# Patient Record
Sex: FEMALE | Race: BLACK OR AFRICAN AMERICAN
[De-identification: names, ages, dates, MRNs, and addresses within clinical notes are randomized per-mention and may not be internally consistent; named-entity substitution may affect disease eponyms.]

---

## 2017-06-24 ENCOUNTER — HOSPITAL ENCOUNTER (EMERGENCY)
Dept: HOSPITAL 61 - ER | Age: 27
Discharge: HOME | End: 2017-06-24
Payer: SELF-PAY

## 2017-06-24 VITALS — WEIGHT: 154 LBS | HEIGHT: 64 IN | BODY MASS INDEX: 26.29 KG/M2

## 2017-06-24 VITALS — SYSTOLIC BLOOD PRESSURE: 112 MMHG | DIASTOLIC BLOOD PRESSURE: 64 MMHG

## 2017-06-24 DIAGNOSIS — R07.89: ICD-10-CM

## 2017-06-24 DIAGNOSIS — J02.0: Primary | ICD-10-CM

## 2017-06-24 LAB
NEGATIVE OBC STREP: (no result)
POSITIVE OBC STREP: (no result)

## 2017-06-24 PROCEDURE — 81025 URINE PREGNANCY TEST: CPT

## 2017-06-24 PROCEDURE — 87880 STREP A ASSAY W/OPTIC: CPT

## 2017-06-24 PROCEDURE — 71020: CPT

## 2017-06-24 PROCEDURE — 93005 ELECTROCARDIOGRAM TRACING: CPT

## 2017-06-24 NOTE — EKG
[facility]

              [address] [place] [address]

Test Date:    2017               Test Time:    06:21:58

Pat Name:     JEREMIAS HILL           Department:   

Patient ID:   [id number]           Room:          

Gender:       F                        Technician:   

:          [id number]               Requested By: MADALYN SERNA

Order Number: [id number]            Reading MD:     

                                 Measurements

Intervals                              Axis          

Rate:         71                       P:            39

WV:           166                      QRS:          6

QRSD:         72                       T:            1

QT:           370                                    

QTc:          407                                    

                           Interpretive Statements

SINUS RHYTHM

QRS(T) CONTOUR ABNORMALITY

CANNOT RULE OUT ANTEROSEPTAL MYOCARDIAL DAMAGE

RI6.01          Unconfirmed report

No previous ECG available for comparison

## 2017-06-24 NOTE — RAD
PA AND LATERAL CHEST RADIOGRAPH



Clinical Indication: chest wall pain.    



Comparison: Two-view chest 6/1/2011.



Findings: 

The cardiomediastinal silhouette is normal. Pulmonary vasculature is normal.

The lungs are clear.  No pleural effusion or pneumothorax is seen. There is no

acute bone abnormality.  



IMPRESSION: 

No acute cardiopulmonary process.

## 2017-06-24 NOTE — PHYS DOC
Past Medical History


Past Medical History:  Other


Additional Past Medical Histor:  post part depression


Past Surgical History:  , Other


Additional Past Surgical Histo:  multiple obgyn sug with bilat fallopian tube 

removal. c sect x 4


Alcohol Use:  None


Drug Use:  None





Adult General


Chief Complaint


Chief Complaint:  MULTIPLE COMPLAINTS





HPI


HPI





27-year-old female presenting in the emergency department with a sore throat 

for 3 days. She has sharp nonradiating pleuritic chest pain as well. The pain 

is intermittent without alleviating or exacerbating factors. She reports a 

contacts. She denies having a cough. She denies unilateral leg swelling 

hemoptysis personal or family history of blood clotting disorders or recent 

surgery. She denies ever smoking and denies diabetes hypertension 

hyperlipidemia.





Review of systems is negative for nausea vomiting diaphoresis fevers or chills. 

All other review of systems is negative unless otherwise noted in history of 

present illness.





ED course: 27-year-old female presenting with sore throat and pleuritic chest 

pain. Afebrile with a normal heart rate. Saturating on a percent on room 

air.Perc neg. EKG obtained which shows sinus rhythm with a regular rate. ST 

segments congruent. Not suggestive of ACS. Nonspecific T-wave inversions 

present. Chest x-ray obtained and strep test sent. Chest x-ray reviewed by 

myself shows no obvious infiltrate or pneumothorax present. No  obvious acute 

cardiopulmonary process present. Strep test positive. The patient was given 

antibiotics upon discharge. The patient was then discharged home in stable 

condition to follow up with their primary care physician over the next 2-3 

days. They were to return if their symptoms worsened or if they were concerned 

for any reason. Face-to-face discharge instructions and return precautions were 

given. Patient's questions were answered to their satisfaction. Patient is 

comfortable plan.





Review of Systems


Review of Systems


SEE ABOVE.





Allergies


Allergies





Allergies








Coded Allergies Type Severity Reaction Last Updated Verified


 


  No Known Allergies Allergy Unknown  16 Yes











Physical Exam


Physical Exam





Constitutional: Well developed, well nourished, no acute distress, non-toxic 

appearance. []


HENT: Normocephalic, atraumatic, bilateral external ears normal, oropharynx 

moist, no oral exudates, nose normal. []


Eyes: PERRLA, EOMI, conjunctiva normal, no discharge. [] 


Neck: Normal range of motion, no tenderness, supple, no stridor. [] 


Cardiovascular:Heart rate regular rhythm, no murmur []


Lungs & Thorax:  Bilateral breath sounds clear to auscultation []


Abdomen: Bowel sounds normal, soft, no tenderness, no masses, no pulsatile 

masses. [] 


Skin: Warm, dry, no erythema, no rash. [] 


Back: No tenderness, no CVA tenderness. [] 


Extremities: No tenderness, no cyanosis, no clubbing, ROM intact, no edema. [] 


Neurologic: Alert and oriented X 3, normal motor function, normal sensory 

function, no focal deficits noted. []


Psychologic: Affect normal, judgement normal, mood normal. []





Current Patient Data


Vital Signs





 Vital Signs








  Date Time  Temp Pulse Resp B/P (MAP) Pulse Ox O2 Delivery O2 Flow Rate FiO2


 


17 06:21 98.9 77 16 112/64 (80) 99 Room Air  





 98.9       








Lab Values





 Laboratory Tests








Test


  17


05:41


 


POC Urine HCG, Qualitative


  Hcg negative


(Negative)











EKG


EKG


[]





Radiology/Procedures


Radiology/Procedures


[]





Course & Med Decision Making


Course & Med Decision Making


Pertinent Labs and Imaging studies reviewed. (See chart for details)





[]





Dragon Disclaimer


Dragon Disclaimer


This electronic medical record was generated, in whole or in part, using a 

voice recognition dictation system.





Departure


Departure


Impression:  


 Primary Impression:  


 Strep throat


 Additional Impressions:  


 Sore throat


 Chest pain


Disposition:   HOME, SELF-CARE


Condition:  STABLE


Referrals:  


NO PCP (PCP)








BRYNN CHAN MD


Patient Instructions:  Chest Pain (Nonspecific), Sore Throat





Additional Instructions:  


Thank you for allowing us to participate in your care today.





Followup with your primary care physician in 3 days if your symptoms do not 

improve.  Call your Primary Doctor tomorrow and inform them of your visit 

today.  If you do not have a primary care provider you can ask for a list of 

our primary care providers. Return to the emergency department you have any new 

or concerning findings.





This should be evaluated by the primary care physician and any necessary 

consulting services for continued management within a few days after discharge. 

Return to emergency room if you have any  new or concerning symptoms including 

but not limited to fever, chills, nausea, vomiting, intractable pain, any new 

rashes, chest pain, shortness of air, uncontrolled bleeding, difficulty 

breathing, and/or vision loss.


Scripts


Amoxicillin (AMOXICILLIN) 500 Mg Capsule


1 CAP PO BID, #20 CAP


   Prov: MADALYN SERNA MD         17





Problem Qualifiers











MADALYN SERNA MD 2017 06:37

## 2019-07-15 ENCOUNTER — HOSPITAL ENCOUNTER (EMERGENCY)
Dept: HOSPITAL 61 - ER | Age: 29
Discharge: HOME | End: 2019-07-15
Payer: COMMERCIAL

## 2019-07-15 VITALS — SYSTOLIC BLOOD PRESSURE: 106 MMHG | DIASTOLIC BLOOD PRESSURE: 55 MMHG

## 2019-07-15 VITALS — HEIGHT: 64 IN | WEIGHT: 169 LBS | BODY MASS INDEX: 28.85 KG/M2

## 2019-07-15 DIAGNOSIS — Z88.2: ICD-10-CM

## 2019-07-15 DIAGNOSIS — G89.29: ICD-10-CM

## 2019-07-15 DIAGNOSIS — N39.0: Primary | ICD-10-CM

## 2019-07-15 DIAGNOSIS — Z90.711: ICD-10-CM

## 2019-07-15 LAB
ALBUMIN SERPL-MCNC: 4 G/DL (ref 3.4–5)
ALBUMIN/GLOB SERPL: 1 {RATIO} (ref 1–1.7)
ALP SERPL-CCNC: 48 U/L (ref 46–116)
ALT SERPL-CCNC: 16 U/L (ref 14–59)
ANION GAP SERPL CALC-SCNC: 14 MMOL/L (ref 6–14)
APTT PPP: YELLOW S
AST SERPL-CCNC: 21 U/L (ref 15–37)
BACTERIA #/AREA URNS HPF: (no result) /HPF
BASOPHILS # BLD AUTO: 0 X10^3/UL (ref 0–0.2)
BASOPHILS NFR BLD: 1 % (ref 0–3)
BILIRUB SERPL-MCNC: 0.3 MG/DL (ref 0.2–1)
BILIRUB UR QL STRIP: NEGATIVE
BUN SERPL-MCNC: 17 MG/DL (ref 7–20)
BUN/CREAT SERPL: 19 (ref 6–20)
CALCIUM SERPL-MCNC: 9 MG/DL (ref 8.5–10.1)
CHLORIDE SERPL-SCNC: 102 MMOL/L (ref 98–107)
CO2 SERPL-SCNC: 25 MMOL/L (ref 21–32)
CREAT SERPL-MCNC: 0.9 MG/DL (ref 0.6–1)
EOSINOPHIL NFR BLD: 0.1 X10^3/UL (ref 0–0.7)
EOSINOPHIL NFR BLD: 1 % (ref 0–3)
ERYTHROCYTE [DISTWIDTH] IN BLOOD BY AUTOMATED COUNT: 19 % (ref 11.5–14.5)
FIBRINOGEN PPP-MCNC: (no result) MG/DL
GFR SERPLBLD BASED ON 1.73 SQ M-ARVRAT: 88.4 ML/MIN
GLOBULIN SER-MCNC: 3.9 G/DL (ref 2.2–3.8)
GLUCOSE SERPL-MCNC: 89 MG/DL (ref 70–99)
HCT VFR BLD CALC: 26.2 % (ref 36–47)
HGB BLD-MCNC: 8.1 G/DL (ref 12–15.5)
HYPOCHROMIA BLD QL SMEAR: SLIGHT
LYMPHOCYTES # BLD: 1 X10^3/UL (ref 1–4.8)
LYMPHOCYTES NFR BLD AUTO: 13 % (ref 24–48)
MACROCYTES BLD QL SMEAR: SLIGHT
MCH RBC QN AUTO: 21 PG (ref 25–35)
MCHC RBC AUTO-ENTMCNC: 31 G/DL (ref 31–37)
MCV RBC AUTO: 69 FL (ref 79–100)
MICROCYTES BLD QL SMEAR: (no result)
MONO #: 0.6 X10^3/UL (ref 0–1.1)
MONOCYTES NFR BLD: 8 % (ref 0–9)
NEUT #: 6 X10^3/UL (ref 1.8–7.7)
NEUTROPHILS NFR BLD AUTO: 77 % (ref 31–73)
NITRITE UR QL STRIP: POSITIVE
PH UR STRIP: 5.5 [PH]
PLATELET # BLD AUTO: 248 X10^3/UL (ref 140–400)
PLATELET # BLD EST: ADEQUATE 10*3/UL
POTASSIUM SERPL-SCNC: 3.6 MMOL/L (ref 3.5–5.1)
PROT SERPL-MCNC: 7.9 G/DL (ref 6.4–8.2)
PROT UR STRIP-MCNC: 100 MG/DL
RBC # BLD AUTO: 3.81 X10^6/UL (ref 3.5–5.4)
RBC #/AREA URNS HPF: (no result) /HPF (ref 0–2)
SODIUM SERPL-SCNC: 141 MMOL/L (ref 136–145)
SQUAMOUS #/AREA URNS LPF: (no result) /LPF
UROBILINOGEN UR-MCNC: 1 MG/DL
WBC # BLD AUTO: 7.8 X10^3/UL (ref 4–11)
WBC #/AREA URNS HPF: (no result) /HPF (ref 0–4)

## 2019-07-15 PROCEDURE — 81001 URINALYSIS AUTO W/SCOPE: CPT

## 2019-07-15 PROCEDURE — 96374 THER/PROPH/DIAG INJ IV PUSH: CPT

## 2019-07-15 PROCEDURE — 99285 EMERGENCY DEPT VISIT HI MDM: CPT

## 2019-07-15 PROCEDURE — 85025 COMPLETE CBC W/AUTO DIFF WBC: CPT

## 2019-07-15 PROCEDURE — 87086 URINE CULTURE/COLONY COUNT: CPT

## 2019-07-15 PROCEDURE — 96375 TX/PRO/DX INJ NEW DRUG ADDON: CPT

## 2019-07-15 PROCEDURE — 87186 SC STD MICRODIL/AGAR DIL: CPT

## 2019-07-15 PROCEDURE — 80053 COMPREHEN METABOLIC PANEL: CPT

## 2019-07-15 PROCEDURE — 74176 CT ABD & PELVIS W/O CONTRAST: CPT

## 2019-07-15 PROCEDURE — 81025 URINE PREGNANCY TEST: CPT

## 2019-07-15 PROCEDURE — 36415 COLL VENOUS BLD VENIPUNCTURE: CPT

## 2019-07-15 NOTE — PHYS DOC
Past Medical History


Past Medical History:  Arthritis, Other


Additional Past Medical Histor:  post partum depression,CHRONIC BACK 

PAIN,BULGING DISC


 (KIYA DOLL)


Past Surgical History:  , Other


Additional Past Surgical Histo:  mult obgyn sug w/bilat fallopian tube removal,c

sect x 4,BLOOD CLOTS/STOMAC


 (KIYA DOLL)


Alcohol Use:  None


Drug Use:  None


 (KIYA DOLL)





Adult General


Chief Complaint


Chief Complaint:  FLANK PAIN





HPI


HPI





29-year-old female presents to ER via POV for complaints of left flank pain 

which radiates into left lower abdomen. Patient states yesterday she developed 

fever and symptoms have worsened. Patient states she took ibuprofen 800 mg at 4 

PM today with minimal relief in symptoms current temperature 100.5. Patient 

reports she has reoccurring UTIs and typically has 3 per month. She called a 

urologist today for an appointment but they wanted her to have labs prior to any

appointment. Patient reports she has had a partial hysterectomy. Patient denies 

nausea or vomiting.


 (KIYA DOLL)





Review of Systems


Review of Systems





Constitutional: Reports fever and fatigue


Eyes: Denies change in visual acuity, redness, or eye pain []


HENT: Denies nasal congestion or sore throat []


Respiratory: Denies cough or shortness of breath []


Cardiovascular: No additional information not addressed in HPI []


GI: Denies nausea, vomiting, bloody stools or diarrhea. Reports lt flank pain 

radiating to lt side lower abd


: Denies hematuria. Reports dysuria


Musculoskeletal: Denies joint pain. Reports lt flank pain


Integument: Denies rash or skin lesions []


Neurologic: Denies headache, focal weakness or sensory changes []





All other systems were reviewed and found to be within normal limits, except as 

documented in this note.


 (KIYA DOLL)





Current Medications


Current Medications





Current Medications








 Medications


  (Trade)  Dose


 Ordered  Sig/Annie  Start Time


 Stop Time Status Last Admin


Dose Admin


 


 Acetaminophen


  (Tylenol)  1,000 mg  1X  ONCE  7/15/19 19:45


 7/15/19 19:51 DC 7/15/19 19:49


1,000 MG


 


 Ceftriaxone Sodium


  (Rocephin)  1 gm  1X  ONCE  7/15/19 20:45


 7/15/19 20:46 DC 7/15/19 20:47


1 GM


 


 Fentanyl Citrate


  (Fentanyl 2ml


 Vial)  50 mcg  1X  ONCE  7/15/19 21:00


 7/15/19 21:01 DC 7/15/19 21:10


50 MCG


 


 Sodium Chloride  1,000 ml @ 


 1,000 mls/hr  1X  ONCE  7/15/19 21:30


 7/15/19 22:29 DC 7/15/19 21:10


1,000 MLS/HR





 (GENET REGALADO MD)





Allergies


Allergies





Allergies








Coded Allergies Type Severity Reaction Last Updated Verified


 


  Sulfa (Sulfonamide Antibiotics) Allergy Severe SWELLING, RASH 7/15/19 Yes





 (GENET REGALADO MD)





Physical Exam


Physical Exam





Constitutional: Well developed, well nourished, no acute distress, non-toxic 

appearance. []


HENT: Normocephalic, atraumatic, oropharynx moist, nose normal. []


Eyes: Pupils equal, conjunctiva normal, no discharge. [] 


Neck: Normal range of motion, supple, no stridor. [] 


Cardiovascular: Heart rate regular rhythm 90s during exam, no murmur []


Lungs & Thorax:  Bilateral breath sounds clear to auscultation- resp. equal/no

nlabored


Abdomen: Bowel sounds normal, soft- no distention/rigidity, no tenderness on 

palp., no masses, no pulsatile masses. [] 


Skin: Warm, dry, no erythema, no rash. [] 


Back: No tenderness, lt CVA tenderness. No rt sided CVA tenderness


Extremities: No tenderness, no cyanosis, no clubbing, ROM intact, no edema. [] 


Neurologic: Alert and oriented X 3, normal motor function, normal sensory fu

nction, no focal deficits noted. []


Psychologic: Affect normal, judgement normal, mood normal. []


 (REFFITT,KIYA WOODSON)





Current Patient Data


Vital Signs





                                   Vital Signs








  Date Time  Temp Pulse Resp B/P (MAP) Pulse Ox O2 Delivery O2 Flow Rate FiO2


 


7/15/19 22:00  88 16 106/55 (72) 96 Room Air  


 


7/15/19 18:40 100.5       





 100.5       





 (GENET REGALADO MD)


Lab Values





                                Laboratory Tests








Test


 7/15/19


18:42 7/15/19


18:46 7/15/19


19:45


 


Urine Collection Type Void    


 


Urine Color Yellow    


 


Urine Clarity Cloudy    


 


Urine pH 5.5    


 


Urine Specific Gravity 1.015    


 


Urine Protein


 100 mg/dL


(NEG-TRACE) 


 





 


Urine Glucose (UA)


 Negative mg/dL


(NEG) 


 





 


Urine Ketones (Stick)


 Negative mg/dL


(NEG) 


 





 


Urine Blood


 Moderate (NEG)


 


 





 


Urine Nitrite


 Positive (NEG)


 


 





 


Urine Bilirubin


 Negative (NEG)


 


 





 


Urine Urobilinogen Dipstick


 1.0 mg/dL (0.2


mg/dL) 


 





 


Urine Leukocyte Esterase Large (NEG)    


 


Urine RBC


 1-2 /HPF (0-2)


 


 





 


Urine WBC


 Tntc /HPF


(0-4) 


 





 


Urine Squamous Epithelial


Cells Few /LPF  


 


 





 


Urine Bacteria


 Many /HPF


(0-FEW) 


 





 


Urine Mucus Slight /LPF    


 


POC Urine HCG, Qualitative


 


 Hcg negative


(Negative) 





 


White Blood Count


 


 


 7.8 x10^3/uL


(4.0-11.0)


 


Red Blood Count


 


 


 3.81 x10^6/uL


(3.50-5.40)


 


Hemoglobin


 


 


 8.1 g/dL


(12.0-15.5)  L


 


Hematocrit


 


 


 26.2 %


(36.0-47.0)  L


 


Mean Corpuscular Volume


 


 


 69 fL ()


L


 


Mean Corpuscular Hemoglobin


 


 


 21 pg (25-35)


L


 


Mean Corpuscular Hemoglobin


Concent 


 


 31 g/dL


(31-37)


 


Red Cell Distribution Width


 


 


 19.0 %


(11.5-14.5)  H


 


Platelet Count


 


 


 248 x10^3/uL


(140-400)


 


Neutrophils (%) (Auto)   77 % (31-73)  H


 


Lymphocytes (%) (Auto)   13 % (24-48)  L


 


Monocytes (%) (Auto)   8 % (0-9)  


 


Eosinophils (%) (Auto)   1 % (0-3)  


 


Basophils (%) (Auto)   1 % (0-3)  


 


Neutrophils # (Auto)


 


 


 6.0 x10^3/uL


(1.8-7.7)


 


Lymphocytes # (Auto)


 


 


 1.0 x10^3/uL


(1.0-4.8)


 


Monocytes # (Auto)


 


 


 0.6 x10^3/uL


(0.0-1.1)


 


Eosinophils # (Auto)


 


 


 0.1 x10^3/uL


(0.0-0.7)


 


Basophils # (Auto)


 


 


 0.0 x10^3/uL


(0.0-0.2)


 


Platelet Estimate


 


 


 Adequate


(ADEQUATE)


 


Hypochromasia   Slight  


 


Microcytosis   Mod  


 


Macrocytosis   Slight  


 


Sodium Level


 


 


 141 mmol/L


(136-145)


 


Potassium Level


 


 


 3.6 mmol/L


(3.5-5.1)


 


Chloride Level


 


 


 102 mmol/L


()


 


Carbon Dioxide Level


 


 


 25 mmol/L


(21-32)


 


Anion Gap   14 (6-14)  


 


Blood Urea Nitrogen


 


 


 17 mg/dL


(7-20)


 


Creatinine


 


 


 0.9 mg/dL


(0.6-1.0)


 


Estimated GFR


(Cockcroft-Gault) 


 


 88.4  





 


BUN/Creatinine Ratio   19 (6-20)  


 


Glucose Level


 


 


 89 mg/dL


(70-99)


 


Calcium Level


 


 


 9.0 mg/dL


(8.5-10.1)


 


Total Bilirubin


 


 


 0.3 mg/dL


(0.2-1.0)


 


Aspartate Amino Transferase


(AST) 


 


 21 U/L (15-37)





 


Alanine Aminotransferase (ALT)


 


 


 16 U/L (14-59)





 


Alkaline Phosphatase


 


 


 48 U/L


()


 


Total Protein


 


 


 7.9 g/dL


(6.4-8.2)


 


Albumin


 


 


 4.0 g/dL


(3.4-5.0)


 


Albumin/Globulin Ratio   1.0 (1.0-1.7)  





                                Laboratory Tests


7/15/19 19:45








                                Laboratory Tests


7/15/19 19:45











Microbiology


7/15/19 Urine Culture - Final, Complete


          


7/15/19 Urine Culture Result 1 (YESENIA) - Final, Complete


          


7/15/19 Antimicrobic Susceptibility - Final, Complete


          


 (GENET REGALADO MD)





EKG


EKG


[]


 (KIYA DOLL)





Radiology/Procedures


Radiology/Procedures


PROCEDURE: CT ABDOMEN PELVIS WO CONTRAST





CT abdomen pelvis without contrast:


 


Reason for examination: Left flank pain and fever.


 


Helical images were obtained through the abdomen and pelvis with no 


intravenous or oral contrast administered. Reconstruction was performed in


sagittal and coronal planes.


 


Exposure: One or more of the following individualized dose reduction 


techniques were utilized for this examination:  1. Automated exposure 


control  2. Adjustment of the mA and/or kV according to patient size  3. 


Use of iterative reconstruction technique.


 


The lung bases are clear. The heart size is normal with no pericardial 


effusion evident.


 


No abnormality seen at the liver, gallbladder, spleen, adrenal glands or 


pancreas. The abdominal aorta and inferior vena cava show no gross 


abnormalities. No abnormality seen at the appendix. The colon is redundant


and shows moderate amount of fecal material but no evidence of 


diverticulosis, diverticulitis or colitis. The small intestinal tract 


shows no abnormal dilatation or wall thickening and no small bowel 


obstruction is seen. Stomach is not distended. The kidneys show no renal 


masses, renal calculi, hydronephrosis or evidence of obstructive uropathy.


 


No abnormality seen at the bladder, uterus or ovaries. No free fluid or 


free air is seen in the abdomen or pelvis. There is a hypodense striated 


lesion consistent with a hemangioma in the L3 vertebral body. No other 


acute bony abnormalities are seen.


 


IMPRESSION:


 


No renal calculi, hydronephrosis or evidence of obstructive uropathy. 


Moderate amount of air and fecal material in the colon without evidence of


diverticulitis or colitis. Hypodense striated lesion in the L3 vertebral 


body consistent with a hemangioma. Redundant colon with moderate amount of


air and fecal material present.


 


Electronically signed by: Manish Tao MD (7/15/2019 9:00 PM) [facility]














DICTATED and SIGNED BY:     MANISH TAO MD


DATE:     07/15/19 2100


 (REFFITTKIYA)





Course & Med Decision Making


Course & Med Decision Making


Pertinent Labs and Imaging studies reviewed. (See chart for details)





2120: Patient was evaluated in the ER for complaints of left flank pain and 

fever. Patient has a UTI and was given dose of IV Rocephin while in the ER. Shayan collins reports history of recurring UTIs with plans to call and schedule an 

appointment with urology for reevaluation and further care. Patient was given IV

 fluids and dose of Tylenol for 100.5 temperature. WBCs normal limits at 7.8 

without left shift. CMP NL. CT abd/pelvis with findings of moderate air/stool- 

no obvious obstruction or findings consistent with colitis/diverticulitis. 

Negative for kidney stones or hydronephrosis. No acute findings reported. Test 

results were discussed with patient. Discussed plans for home discharge with 

prescription for Keflex for UTI. Will provide small quantity of pain medication 

with patient strongly advised to use over-the-counter stool softeners and to i

ncrease fluid intake daily. Patient's H&H 8.1/26.2 this was discussed with 

patient she reports chronic history of anemia and she is not currently on her 

iron supplements. Patient plans to start her supplement again as she states she 

in the past had been told she may need iron infusions. Patient advised that she 

would need to have labs rechecked with her primary care physician and to have 

further care regarding her anemia. She remains nontoxic in appearance and in no 

distress during this discussion. Education provided on signs and symptoms to 

return to ER. Discharge instructions were discussed. 


 (KIYA DOLL)


Course & Med Decision Making





Staff Physician Addendum:


I was working in the ER during the course of this patient's visit.  I was 

available for consultation as needed, but I was not directly involved in the 

care of this patient.    


 (GENET REGALADO MD)


Dragon Disclaimer


Dragon Disclaimer


This electronic medical record was generated, in whole or in part, using a voice

 recognition dictation system.


 (KIYA DOLL)





Departure


Departure


Impression:  


   Primary Impression:  


   Urinary tract infection


   Additional Impressions:  


   Fever


   Left flank pain


Disposition:   HOME, SELF-CARE


Referrals:  


UNKNOWN PCP NAME (PCP)


Patient Instructions:  Constipation, Adult, Fever, Adult, Flank Pain, Urinary 

Tract Infection





Additional Instructions:  


Drink adequate water daily.





Tylenol and/or ibuprofen as needed for pain as directed on container. 





Call and schedule a follow-up appointment with a urologist as soon as possible 

for reevaluation and further care.





Stool softeners and laxatives as needed for bowel movement- over the counter as 

directed on container. 





You are being provided with a narcotic medication which can increase her risk 

for constipation. Take a stool softener and increase water intake if taking this

 medication.


Scripts


Hydrocodone/Apap 5-325 (NORCO 5-325 TABLET) 1 Each Tablet


1 TAB PO PRN Q6HRS PRN for PAIN, #10 TAB 0 Refills


   No driving or drinking alcohol while taking this medication


   Prov: KIYA DOLL         7/15/19 


Cephalexin (KEFLEX) 500 Mg Capsule


1 CAP PO BID, #14 CAP 0 Refills


   Prov: KIYA DOLL         7/15/19





Problem Qualifiers











KIYA DOLL          Jul 15, 2019 19:49


GENET REGALADO MD            2019 23:23

## 2019-07-15 NOTE — RAD
CT abdomen pelvis without contrast:

 

Reason for examination: Left flank pain and fever.

 

Helical images were obtained through the abdomen and pelvis with no 

intravenous or oral contrast administered. Reconstruction was performed in

sagittal and coronal planes.

 

Exposure: One or more of the following individualized dose reduction 

techniques were utilized for this examination:  1. Automated exposure 

control  2. Adjustment of the mA and/or kV according to patient size  3. 

Use of iterative reconstruction technique.

 

The lung bases are clear. The heart size is normal with no pericardial 

effusion evident.

 

No abnormality seen at the liver, gallbladder, spleen, adrenal glands or 

pancreas. The abdominal aorta and inferior vena cava show no gross 

abnormalities. No abnormality seen at the appendix. The colon is redundant

and shows moderate amount of fecal material but no evidence of 

diverticulosis, diverticulitis or colitis. The small intestinal tract 

shows no abnormal dilatation or wall thickening and no small bowel 

obstruction is seen. Stomach is not distended. The kidneys show no renal 

masses, renal calculi, hydronephrosis or evidence of obstructive uropathy.

 

No abnormality seen at the bladder, uterus or ovaries. No free fluid or 

free air is seen in the abdomen or pelvis. There is a hypodense striated 

lesion consistent with a hemangioma in the L3 vertebral body. No other 

acute bony abnormalities are seen.

 

IMPRESSION:

 

No renal calculi, hydronephrosis or evidence of obstructive uropathy. 

Moderate amount of air and fecal material in the colon without evidence of

diverticulitis or colitis. Hypodense striated lesion in the L3 vertebral 

body consistent with a hemangioma. Redundant colon with moderate amount of

air and fecal material present.

 

Electronically signed by: Sharon Medellin MD (7/15/2019 9:00 PM) [facility]

## 2019-12-18 ENCOUNTER — HOSPITAL ENCOUNTER (OUTPATIENT)
Dept: HOSPITAL 61 - SURG | Age: 29
Setting detail: OBSERVATION
LOS: 1 days | Discharge: HOME | End: 2019-12-19
Attending: SURGERY | Admitting: SURGERY
Payer: COMMERCIAL

## 2019-12-18 VITALS — DIASTOLIC BLOOD PRESSURE: 63 MMHG | SYSTOLIC BLOOD PRESSURE: 106 MMHG

## 2019-12-18 VITALS — SYSTOLIC BLOOD PRESSURE: 107 MMHG | DIASTOLIC BLOOD PRESSURE: 59 MMHG

## 2019-12-18 VITALS — SYSTOLIC BLOOD PRESSURE: 106 MMHG | DIASTOLIC BLOOD PRESSURE: 58 MMHG

## 2019-12-18 VITALS — DIASTOLIC BLOOD PRESSURE: 66 MMHG | SYSTOLIC BLOOD PRESSURE: 109 MMHG

## 2019-12-18 VITALS — DIASTOLIC BLOOD PRESSURE: 55 MMHG | SYSTOLIC BLOOD PRESSURE: 118 MMHG

## 2019-12-18 VITALS — SYSTOLIC BLOOD PRESSURE: 105 MMHG | DIASTOLIC BLOOD PRESSURE: 58 MMHG

## 2019-12-18 VITALS — DIASTOLIC BLOOD PRESSURE: 53 MMHG | SYSTOLIC BLOOD PRESSURE: 93 MMHG

## 2019-12-18 VITALS — SYSTOLIC BLOOD PRESSURE: 117 MMHG | DIASTOLIC BLOOD PRESSURE: 74 MMHG

## 2019-12-18 VITALS — SYSTOLIC BLOOD PRESSURE: 113 MMHG | DIASTOLIC BLOOD PRESSURE: 57 MMHG

## 2019-12-18 VITALS — WEIGHT: 160 LBS | BODY MASS INDEX: 27.31 KG/M2 | HEIGHT: 64 IN

## 2019-12-18 DIAGNOSIS — R10.32: Primary | ICD-10-CM

## 2019-12-18 DIAGNOSIS — Z98.890: ICD-10-CM

## 2019-12-18 DIAGNOSIS — R19.00: ICD-10-CM

## 2019-12-18 DIAGNOSIS — Z98.891: ICD-10-CM

## 2019-12-18 DIAGNOSIS — L40.9: ICD-10-CM

## 2019-12-18 LAB
ANISOCYTOSIS BLD QL SMEAR: PRESENT
BASOPHILS # BLD AUTO: 0.1 X10^3/UL (ref 0–0.2)
BASOPHILS NFR BLD: 1 % (ref 0–3)
EOSINOPHIL NFR BLD: 0.1 X10^3/UL (ref 0–0.7)
EOSINOPHIL NFR BLD: 3 % (ref 0–3)
ERYTHROCYTE [DISTWIDTH] IN BLOOD BY AUTOMATED COUNT: 19.4 % (ref 11.5–14.5)
HCT VFR BLD CALC: 27.9 % (ref 36–47)
HGB BLD-MCNC: 8.7 G/DL (ref 12–15.5)
HYPOCHROMIA BLD QL SMEAR: PRESENT
LYMPHOCYTES # BLD: 0.8 X10^3/UL (ref 1–4.8)
LYMPHOCYTES NFR BLD AUTO: 23 % (ref 24–48)
MCH RBC QN AUTO: 22 PG (ref 25–35)
MCHC RBC AUTO-ENTMCNC: 31 G/DL (ref 31–37)
MCV RBC AUTO: 70 FL (ref 79–100)
MICROCYTES BLD QL SMEAR: PRESENT
MONO #: 0.3 X10^3/UL (ref 0–1.1)
MONOCYTES NFR BLD: 7 % (ref 0–9)
NEUT #: 2.4 X10^3/UL (ref 1.8–7.7)
NEUTROPHILS NFR BLD AUTO: 66 % (ref 31–73)
PLATELET # BLD AUTO: 140 X10^3/UL (ref 140–400)
PLATELET # BLD EST: ADEQUATE 10*3/UL
RBC # BLD AUTO: 4 X10^6/UL (ref 3.5–5.4)
WBC # BLD AUTO: 3.7 X10^3/UL (ref 4–11)

## 2019-12-18 PROCEDURE — 96375 TX/PRO/DX INJ NEW DRUG ADDON: CPT

## 2019-12-18 PROCEDURE — 85025 COMPLETE CBC W/AUTO DIFF WBC: CPT

## 2019-12-18 PROCEDURE — 96376 TX/PRO/DX INJ SAME DRUG ADON: CPT

## 2019-12-18 PROCEDURE — G0378 HOSPITAL OBSERVATION PER HR: HCPCS

## 2019-12-18 PROCEDURE — 22901 EXC ABDL TUM DEEP 5 CM/>: CPT

## 2019-12-18 PROCEDURE — 36415 COLL VENOUS BLD VENIPUNCTURE: CPT

## 2019-12-18 PROCEDURE — 96374 THER/PROPH/DIAG INJ IV PUSH: CPT

## 2019-12-18 PROCEDURE — G0379 DIRECT REFER HOSPITAL OBSERV: HCPCS

## 2019-12-18 PROCEDURE — A7015 AEROSOL MASK USED W NEBULIZE: HCPCS

## 2019-12-18 PROCEDURE — 88305 TISSUE EXAM BY PATHOLOGIST: CPT

## 2019-12-18 PROCEDURE — 81025 URINE PREGNANCY TEST: CPT

## 2019-12-18 RX ADMIN — SODIUM CHLORIDE SCH MLS/HR: 450 INJECTION, SOLUTION INTRAVENOUS at 12:36

## 2019-12-18 RX ADMIN — HYDROMORPHONE HYDROCHLORIDE PRN MG: 2 INJECTION INTRAMUSCULAR; INTRAVENOUS; SUBCUTANEOUS at 13:41

## 2019-12-18 RX ADMIN — FENTANYL CITRATE PRN MCG: 50 INJECTION INTRAMUSCULAR; INTRAVENOUS at 13:15

## 2019-12-18 RX ADMIN — OXYCODONE HYDROCHLORIDE AND ACETAMINOPHEN PRN TAB: 5; 325 TABLET ORAL at 22:49

## 2019-12-18 RX ADMIN — HYDROMORPHONE HYDROCHLORIDE PRN MG: 2 INJECTION INTRAMUSCULAR; INTRAVENOUS; SUBCUTANEOUS at 13:51

## 2019-12-18 RX ADMIN — FENTANYL CITRATE PRN MCG: 50 INJECTION INTRAMUSCULAR; INTRAVENOUS at 13:24

## 2019-12-18 RX ADMIN — HYDROMORPHONE HYDROCHLORIDE PRN MG: 2 INJECTION INTRAMUSCULAR; INTRAVENOUS; SUBCUTANEOUS at 14:50

## 2019-12-18 RX ADMIN — FENTANYL CITRATE PRN MCG: 50 INJECTION INTRAMUSCULAR; INTRAVENOUS at 12:54

## 2019-12-18 RX ADMIN — FENTANYL CITRATE PRN MCG: 50 INJECTION INTRAMUSCULAR; INTRAVENOUS at 13:35

## 2019-12-18 RX ADMIN — HYDROMORPHONE HYDROCHLORIDE PRN MG: 2 INJECTION INTRAMUSCULAR; INTRAVENOUS; SUBCUTANEOUS at 19:06

## 2019-12-18 NOTE — PDOC4
Operative Note


Operative Note


Operative Note:





Preoperative Diagnosis:  Abdominal wall nodule





Postoperative Diagnosis: Same





Procedure: Excision of abdominal wall nodule, closure of fascia with onlay 

Phasix mesh





Surgeon:  George   





Assistant:  Janell GAMBLE





Anesthesia:  Gen





EBL: 50 ml





Specimen:  Abdominal wall nodule to pathology





Drains:  19 Fr RANDI





Complications:  None





Indication:  The patient is a 29-year-old female who presented with a tender 

abdominal wall nodule. Her preoperative evaluation appeared most consistent with

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 The plan is for surgical excision. The 

risks of surgery were discussed which include bleeding, infection, hernia 

formation, recurrence, pain, scar tissue, anesthetic risk, potential need for 

additional surgery or procedure. She understands and would like to proceed.





Description:  The patient was taken to the operating room and placed supine on 

the operating table. General anesthesia was performed. The abdomen was prepped 

with ChloraPrep and draped in a standard surgical manner. An incision was made 

in the left lower quadrant at the site of the tender nodule. Cautery dissection 

was carried down to the fascia. There was a marked amount of scar tissue from 

multiple prior surgeries. The fascia was delineated surrounding the area of 

concern. There was a thickened palpable nodular area in the left lower quadrant 

that was embedded within the abdominal fascia and muscle. The mass was fully 

excised using cautery and measured 7 x 4 cm. The mass was sent to pathology for 

evaluation.  The subcutaneous tissues were developed laterally to help with 

primary closure of the residual fascial defect.  A small area of posterior 

fascia was approximated with 0 Vicryl. The anterior fascial layer was then 

closed primarily with 1 PDS. The entire area was then reinforced with a Phasix 

mesh patch placed as an onlay. The mesh was sutured into position with multiple 

2-0 Vicryl sutures. A 19 [place] round Federico drain was left over the mesh which 

exited laterally. This was secured to the skin with 2-0 silk. The subcutaneous 

tissues tissues closed over the mesh with 3-0 Vicryl. The skin was closed with 

4-0 Monocryl. Steri-Strips and a sterile dressing were then applied. The patient

tolerated the procedure well and was sent to the recovery room in stable 

condition. At the end of the case all counts were correct.











LANDON ROPER MD             Dec 18, 2019 12:36

## 2019-12-18 NOTE — NUR
The patient, JEREMIAS HILL, 28 y/o, F admitted by LANDON ROPER MD, was given written 
information regarding hospital policies, unit procedures and contact persons.  



C/o itching, order for benadryl from Dr. Heredia recieved, entered into MAR. Med was admin'd 
and effective.

## 2019-12-19 VITALS — SYSTOLIC BLOOD PRESSURE: 90 MMHG | DIASTOLIC BLOOD PRESSURE: 47 MMHG

## 2019-12-19 VITALS — SYSTOLIC BLOOD PRESSURE: 89 MMHG | DIASTOLIC BLOOD PRESSURE: 54 MMHG

## 2019-12-19 VITALS — DIASTOLIC BLOOD PRESSURE: 58 MMHG | SYSTOLIC BLOOD PRESSURE: 95 MMHG

## 2019-12-19 VITALS — DIASTOLIC BLOOD PRESSURE: 58 MMHG | SYSTOLIC BLOOD PRESSURE: 97 MMHG

## 2019-12-19 RX ADMIN — OXYCODONE HYDROCHLORIDE AND ACETAMINOPHEN PRN TAB: 5; 325 TABLET ORAL at 12:25

## 2019-12-19 RX ADMIN — HYDROMORPHONE HYDROCHLORIDE PRN MG: 2 INJECTION INTRAMUSCULAR; INTRAVENOUS; SUBCUTANEOUS at 01:46

## 2019-12-19 RX ADMIN — OXYCODONE HYDROCHLORIDE AND ACETAMINOPHEN PRN TAB: 5; 325 TABLET ORAL at 08:50

## 2019-12-19 RX ADMIN — SODIUM CHLORIDE SCH MLS/HR: 450 INJECTION, SOLUTION INTRAVENOUS at 01:41

## 2019-12-19 NOTE — PDOC
SURGICAL PROGRESS NOTE


Subjective


pretty sore this AM


had nausea last night


Vital Signs





Vital Signs








  Date Time  Temp Pulse Resp B/P (MAP) Pulse Ox O2 Delivery O2 Flow Rate FiO2


 


12/19/19 08:50   16   Room Air  


 


12/19/19 07:15 98.0 58  90/47 (61) 100   





 98.0       


 


12/18/19 17:00       10.0 








I&O











Intake and Output 


 


 12/19/19





 07:00


 


Intake Total 1970 ml


 


Output Total 120 ml


 


Balance 1850 ml


 


 


 


Intake Oral 120 ml


 


IV Total 1850 ml


 


Output Drainage Total 70 ml


 


Estimated Blood Loss 50 ml


 


# Voids 3








General:  Alert, Oriented X3, Cooperative


Abdomen:  Soft, Other (ND, dressing dry, binder in place, drain serosang )


Labs





Laboratory Tests








Test


 12/18/19


07:58 12/18/19


08:10


 


Bedside Urine HCG, Qualitative


 Hcg negative


(Negative) 





 


White Blood Count


 


 3.7 x10^3/uL


(4.0-11.0)


 


Red Blood Count


 


 4.00 x10^6/uL


(3.50-5.40)


 


Hemoglobin


 


 8.7 g/dL


(12.0-15.5)


 


Hematocrit


 


 27.9 %


(36.0-47.0)


 


Mean Corpuscular Volume  70 fL () 


 


Mean Corpuscular Hemoglobin  22 pg (25-35) 


 


Mean Corpuscular Hemoglobin


Concent 


 31 g/dL


(31-37)


 


Red Cell Distribution Width


 


 19.4 %


(11.5-14.5)


 


Platelet Count


 


 140 x10^3/uL


(140-400)


 


Neutrophils (%) (Auto)  66 % (31-73) 


 


Lymphocytes (%) (Auto)  23 % (24-48) 


 


Monocytes (%) (Auto)  7 % (0-9) 


 


Eosinophils (%) (Auto)  3 % (0-3) 


 


Basophils (%) (Auto)  1 % (0-3) 


 


Neutrophils # (Auto)


 


 2.4 x10^3/uL


(1.8-7.7)


 


Lymphocytes # (Auto)


 


 0.8 x10^3/uL


(1.0-4.8)


 


Monocytes # (Auto)


 


 0.3 x10^3/uL


(0.0-1.1)


 


Eosinophils # (Auto)


 


 0.1 x10^3/uL


(0.0-0.7)


 


Basophils # (Auto)


 


 0.1 x10^3/uL


(0.0-0.2)


 


Platelet Estimate


 


 Adequate


(ADEQUATE)


 


Hypochromasia  Present 


 


Anisocytosis  Present 


 


Microcytosis  Present 








Assessment/Plan


s/p excision abdominal mass, mesh 


increase activity, ambulate


home after lunch











GEORGINA CROCKETT            Dec 19, 2019 08:58

## 2019-12-19 NOTE — DISCH
DISCHARGE INSTRUCTIONS


Condition on Discharge


Condition on Discharge:  Stable





Activity After Discharge


Activity Instructions for Disc:  Avoid exertion, Progressive ambulation


Other activity instructions:  binder when up


Bathing Instructions:  Shower-keep dressing dry


Lifting Instructions after Dis:  No heavy lifting, No pulling or pushing


Driving Instructions after Dis:  Do not drive (while taking pain medication)





Diet after Discharge


Diet after Discharge:  Regular





Wound Incision Care


Wound/Incision Care:  Change dressing, May get incision wet


Other wound/incision instructi:  drain care as instructed





Contacting the DRJaclyn after DC


Call your doctor for:  Concerns you may have





Follow-Up


Follow up with:  Dr Vazquez 2 weeks, call to [facility] [phone]











GEORGINA CROCKETT            Dec 19, 2019 09:04

## 2019-12-19 NOTE — NUR
SW following. Discussed with RN. RN advised no SW needs and anticipates pt will discharge 
home with self care today after lunch. SW will continue to follow.

## 2019-12-20 NOTE — PATHOLOGY
[facility] Accession Number: [id number]

.                                                                01

Material submitted:                                        .

abdomen - ABDOMINAL WALL NODULE. Modifiers: wall

.                                                                01

Clinical history:                                          .

Left lower quadrant abdominal pain

.                                                                02

**********************************************************************

Diagnosis:

Segment of fibroadipose and skeletal muscle tissue, left abdominal wall

mass excision:

- Endometriosis with associated scarring and focal chronic inflammation

and hemosiderin-laden macrophages.

(JPM:; 12/20/2019)

[facility]  12/20/2019  1021 Local

**********************************************************************

.                                                                02

Comment:

There is no evidence of malignancy.

(JPM:; 12/20/2019)

.                                                                02

Electronically signed:                                     .

Freddie Tony MD, Pathologist

NPI- [id number]

.                                                                01

Gross description:                                         .

The specimen is received in formalin, labeled "Rowan Lucasmack, abdominal

wall nodule".  Received is a segment of pink-gray fibromembranous tissue

with a slight amount of attached fibroadipose tissue measuring 7.8 x 3.6 x

2.6 cm in greatest dimensions. The surgical margin is inked.  Sectioning

reveals white, fibrous cut surfaces throughout with approximately 1%

yellow-tan lobulated tissue present.  There are several small areas of

hemorrhage scattered throughout the fibrous tissue ranging in size from

0.1 to 0.4 cm.  The specimen is submitted representatively in cassettes A1

through A4.

([facility]; 12/19/2019)

[facility]  12/19/2019  0842 Local

.                                                                02

Pathologist provided ICD-10:

N80.8

.                                                                02

CPT                                                        .

985979

Specimen Comment: A courtesy copy of this report has been sent to [phone], [phone]

Specimen Comment: [id number]

Specimen Comment: Report sent to  and 

***Performed at:  01

   [facility]

   [address], [place]  [id number]

   MD Saad Dacosta MD Phone:  [phone]

***Performed at:  02

   [facility]

   [address] [place]  [id number]

   MD Freddie Tony MD Phone:  [phone]

## 2019-12-23 ENCOUNTER — HOSPITAL ENCOUNTER (EMERGENCY)
Dept: HOSPITAL 61 - ER | Age: 29
Discharge: HOME | End: 2019-12-23
Payer: COMMERCIAL

## 2019-12-23 VITALS — BODY MASS INDEX: 28.68 KG/M2 | WEIGHT: 168 LBS | HEIGHT: 64 IN

## 2019-12-23 VITALS — DIASTOLIC BLOOD PRESSURE: 74 MMHG | SYSTOLIC BLOOD PRESSURE: 119 MMHG

## 2019-12-23 DIAGNOSIS — R10.32: ICD-10-CM

## 2019-12-23 DIAGNOSIS — Z88.1: ICD-10-CM

## 2019-12-23 DIAGNOSIS — K59.00: ICD-10-CM

## 2019-12-23 DIAGNOSIS — Z88.2: ICD-10-CM

## 2019-12-23 DIAGNOSIS — G89.29: ICD-10-CM

## 2019-12-23 DIAGNOSIS — G89.18: Primary | ICD-10-CM

## 2019-12-23 LAB
ALBUMIN SERPL-MCNC: 3.5 G/DL (ref 3.4–5)
ALBUMIN/GLOB SERPL: 0.9 {RATIO} (ref 1–1.7)
ALP SERPL-CCNC: 37 U/L (ref 46–116)
ALT SERPL-CCNC: 10 U/L (ref 14–59)
ANION GAP SERPL CALC-SCNC: 9 MMOL/L (ref 6–14)
ANISOCYTOSIS BLD QL SMEAR: (no result)
APTT PPP: YELLOW S
AST SERPL-CCNC: 14 U/L (ref 15–37)
BACTERIA #/AREA URNS HPF: 0 /HPF
BASOPHILS # BLD AUTO: 0 X10^3/UL (ref 0–0.2)
BASOPHILS NFR BLD: 1 % (ref 0–3)
BILIRUB SERPL-MCNC: 0.2 MG/DL (ref 0.2–1)
BILIRUB UR QL STRIP: NEGATIVE
BUN SERPL-MCNC: 11 MG/DL (ref 7–20)
BUN/CREAT SERPL: 14 (ref 6–20)
CALCIUM SERPL-MCNC: 9.1 MG/DL (ref 8.5–10.1)
CHLORIDE SERPL-SCNC: 102 MMOL/L (ref 98–107)
CO2 SERPL-SCNC: 28 MMOL/L (ref 21–32)
CREAT SERPL-MCNC: 0.8 MG/DL (ref 0.6–1)
DACRYOCYTES BLD QL SMEAR: (no result)
EOSINOPHIL NFR BLD: 0.3 X10^3/UL (ref 0–0.7)
EOSINOPHIL NFR BLD: 6 % (ref 0–3)
ERYTHROCYTE [DISTWIDTH] IN BLOOD BY AUTOMATED COUNT: 20.2 % (ref 11.5–14.5)
FIBRINOGEN PPP-MCNC: CLEAR MG/DL
GFR SERPLBLD BASED ON 1.73 SQ M-ARVRAT: 101.2 ML/MIN
GLOBULIN SER-MCNC: 4 G/DL (ref 2.2–3.8)
GLUCOSE SERPL-MCNC: 91 MG/DL (ref 70–99)
HCT VFR BLD CALC: 27.6 % (ref 36–47)
HGB BLD-MCNC: 8.4 G/DL (ref 12–15.5)
HYPOCHROMIA BLD QL SMEAR: SLIGHT
LYMPHOCYTES # BLD: 0.8 X10^3/UL (ref 1–4.8)
LYMPHOCYTES NFR BLD AUTO: 20 % (ref 24–48)
MCH RBC QN AUTO: 21 PG (ref 25–35)
MCHC RBC AUTO-ENTMCNC: 31 G/DL (ref 31–37)
MCV RBC AUTO: 70 FL (ref 79–100)
MICROCYTES BLD QL SMEAR: SLIGHT
MONO #: 0.3 X10^3/UL (ref 0–1.1)
MONOCYTES NFR BLD: 6 % (ref 0–9)
NEUT #: 2.8 X10^3/UL (ref 1.8–7.7)
NEUTROPHILS NFR BLD AUTO: 67 % (ref 31–73)
NITRITE UR QL STRIP: NEGATIVE
OVALOCYTES BLD QL SMEAR: (no result)
PH UR STRIP: 6.5 [PH]
PLATELET # BLD AUTO: 194 X10^3/UL (ref 140–400)
PLATELET # BLD EST: ADEQUATE 10*3/UL
POLYCHROMASIA BLD QL SMEAR: SLIGHT
POTASSIUM SERPL-SCNC: 3.8 MMOL/L (ref 3.5–5.1)
PROT SERPL-MCNC: 7.5 G/DL (ref 6.4–8.2)
PROT UR STRIP-MCNC: NEGATIVE MG/DL
RBC # BLD AUTO: 3.94 X10^6/UL (ref 3.5–5.4)
RBC #/AREA URNS HPF: (no result) /HPF (ref 0–2)
SODIUM SERPL-SCNC: 139 MMOL/L (ref 136–145)
SQUAMOUS #/AREA URNS LPF: (no result) /LPF
TARGETS BLD QL SMEAR: (no result)
UROBILINOGEN UR-MCNC: 1 MG/DL
WBC # BLD AUTO: 4.3 X10^3/UL (ref 4–11)
WBC #/AREA URNS HPF: (no result) /HPF (ref 0–4)

## 2019-12-23 PROCEDURE — 96375 TX/PRO/DX INJ NEW DRUG ADDON: CPT

## 2019-12-23 PROCEDURE — 85025 COMPLETE CBC W/AUTO DIFF WBC: CPT

## 2019-12-23 PROCEDURE — 99285 EMERGENCY DEPT VISIT HI MDM: CPT

## 2019-12-23 PROCEDURE — 74177 CT ABD & PELVIS W/CONTRAST: CPT

## 2019-12-23 PROCEDURE — 80053 COMPREHEN METABOLIC PANEL: CPT

## 2019-12-23 PROCEDURE — 96374 THER/PROPH/DIAG INJ IV PUSH: CPT

## 2019-12-23 PROCEDURE — 81001 URINALYSIS AUTO W/SCOPE: CPT

## 2019-12-23 PROCEDURE — 36415 COLL VENOUS BLD VENIPUNCTURE: CPT

## 2019-12-23 RX ADMIN — FENTANYL CITRATE ONE MCG: 50 INJECTION INTRAMUSCULAR; INTRAVENOUS at 09:13

## 2019-12-23 RX ADMIN — IOHEXOL ONE ML: 300 INJECTION, SOLUTION INTRAVENOUS at 10:05

## 2019-12-23 RX ADMIN — ONDANSETRON ONE MG: 2 INJECTION INTRAMUSCULAR; INTRAVENOUS at 09:12

## 2019-12-23 NOTE — PHYS DOC
Past Medical History


Past Medical History:  Arthritis, Other


Additional Past Medical Histor:  post partum depression,CHRONIC BACK 

PAIN,BULGING DISC


Past Surgical History:  , Other


Additional Past Surgical Histo:  mult obgyn sug w/bilat fallopian tube removal,c

sect x 4,BLOOD CLOTS/ABD


Alcohol Use:  None


Drug Use:  None





Adult General


Chief Complaint


Chief Complaint:  POST-OP PROBLEM





HPI


HPI





Patient is a 29  year old female who presented to ER today for evaluation of 

abdominal pain, left-sided flank pain. Patient had a mass removed on her left 

abdominal wall area on 2019.  She has a drain in the abdominal wall

area to drain the hematoma. The pain get worse this morning, despite taking her 

pain medication came here for evaluation. Patient denies any fever. Patient 

denies any nausea vomiting. Patient also says she is constipated.





She denies any chest pain, no trouble breathing.





All other ROS is negative unless otherwise noted in HPI





Review of Systems


Review of Systems


See above





Current Medications


Current Medications





Current Medications








 Medications


  (Trade)  Dose


 Ordered  Sig/Annie  Start Time


 Stop Time Status Last Admin


Dose Admin


 


 Fentanyl Citrate


  (Fentanyl 2ml


 Vial)  50 mcg  1X  ONCE  19 09:00


 19 09:01 DC 19 09:13


50 MCG


 


 Info


  (CONTRAST GIVEN


 -- Rx MONITORING)  1 each  PRN DAILY  PRN  19 10:00


 19 11:30 DC  





 


 Iohexol


  (Omnipaque 300


 Mg/ml)  100 ml  STK-MED ONCE  19 09:56


 19 09:56 DC  





 


 Ondansetron HCl


  (Zofran)  4 mg  1X  ONCE  19 09:00


 19 09:01 DC 19 09:12


4 MG











Allergies


Allergies





Allergies








Coded Allergies Type Severity Reaction Last Updated Verified


 


  sulfamethoxazole Allergy Severe Swelling 19 Yes


 


  trimethoprim Allergy Severe Swelling 19 Yes











Physical Exam


Physical Exam


See above


Constitutional: Well developed, well nourished, no acute distress, non-toxic 

appearance. []


HENT: Normocephalic, atraumatic, bilateral external ears normal, oropharynx 

moist, no oral exudates, nose normal. []


Eyes: PERRLA, EOMI, conjunctiva normal, no discharge. [] 


Neck: Normal range of motion, no tenderness, supple, no stridor. [] 


Cardiovascular:Heart rate regular rhythm, no murmur []


Lungs & Thorax:  Bilateral breath sounds clear to auscultation []


Abdomen: Bowel sounds normal, soft,  THere is tenderness to palpation on left 

flank AND llq area,  WOUND DRAIN IN PLACE WITH DARK RED BLOOD IN TUBING.  no m

asses, no pulsatile masses. [] 


Skin: Warm, dry, no erythema, no rash. [] 


Back: No tenderness, no CVA tenderness. [] 


Extremities: No tenderness, no cyanosis, no clubbing, ROM intact, no edema. [] 


Neurologic: Alert and oriented X 3, normal motor function, normal sensory 

function, no focal deficits noted. []


Psychologic: Affect normal, judgement normal, mood normal. []





Current Patient Data


Vital Signs





                                   Vital Signs








  Date Time  Temp Pulse Resp B/P (MAP) Pulse Ox O2 Delivery O2 Flow Rate FiO2


 


19 11:15  72 15 119/74 (89) 100 Room Air  


 


19 07:40 98.2       





 98.2       








Lab Values





                                Laboratory Tests








Test


 19


07:45 19


08:10


 


Urine Collection Type Void   


 


Urine Color Yellow   


 


Urine Clarity Clear   


 


Urine pH 6.5   


 


Urine Specific Gravity 1.020   


 


Urine Protein


 Negative mg/dL


(NEG-TRACE) 





 


Urine Glucose (UA)


 Negative mg/dL


(NEG) 





 


Urine Ketones (Stick)


 Negative mg/dL


(NEG) 





 


Urine Blood Small (NEG)   


 


Urine Nitrite


 Negative (NEG)


 





 


Urine Bilirubin


 Negative (NEG)


 





 


Urine Urobilinogen Dipstick


 1.0 mg/dL (0.2


mg/dL) 





 


Urine Leukocyte Esterase


 Negative (NEG)


 





 


Urine RBC


 Occ /HPF (0-2)


 





 


Urine WBC


 Occ /HPF (0-4)


 





 


Urine Squamous Epithelial


Cells Mod /LPF  


 





 


Urine Bacteria


 0 /HPF (0-FEW)


 





 


Urine Mucus Slight /LPF   


 


White Blood Count


 


 4.3 x10^3/uL


(4.0-11.0)


 


Red Blood Count


 


 3.94 x10^6/uL


(3.50-5.40)


 


Hemoglobin


 


 8.4 g/dL


(12.0-15.5)  L


 


Hematocrit


 


 27.6 %


(36.0-47.0)  L


 


Mean Corpuscular Volume


 


 70 fL ()


L


 


Mean Corpuscular Hemoglobin


 


 21 pg (25-35)


L


 


Mean Corpuscular Hemoglobin


Concent 


 31 g/dL


(31-37)


 


Red Cell Distribution Width


 


 20.2 %


(11.5-14.5)  H


 


Platelet Count


 


 194 x10^3/uL


(140-400)


 


Neutrophils (%) (Auto)  67 % (31-73)  


 


Lymphocytes (%) (Auto)  20 % (24-48)  L


 


Monocytes (%) (Auto)  6 % (0-9)  


 


Eosinophils (%) (Auto)  6 % (0-3)  H


 


Basophils (%) (Auto)  1 % (0-3)  


 


Neutrophils # (Auto)


 


 2.8 x10^3/uL


(1.8-7.7)


 


Lymphocytes # (Auto)


 


 0.8 x10^3/uL


(1.0-4.8)  L


 


Monocytes # (Auto)


 


 0.3 x10^3/uL


(0.0-1.1)


 


Eosinophils # (Auto)


 


 0.3 x10^3/uL


(0.0-0.7)


 


Basophils # (Auto)


 


 0.0 x10^3/uL


(0.0-0.2)


 


Platelet Estimate


 


 Adequate


(ADEQUATE)


 


Large Platelets  Occ  


 


Giant Platelets  Occ  


 


Polychromasia  Slight  


 


Hypochromasia  Slight  


 


Anisocytosis  Mod  


 


Microcytosis  Slight  


 


Target Cells  Few  


 


Tear Drop Cells  Few  


 


Ovalocytes  Few  


 


Sodium Level


 


 139 mmol/L


(136-145)


 


Potassium Level


 


 3.8 mmol/L


(3.5-5.1)


 


Chloride Level


 


 102 mmol/L


()


 


Carbon Dioxide Level


 


 28 mmol/L


(21-32)


 


Anion Gap  9 (6-14)  


 


Blood Urea Nitrogen


 


 11 mg/dL


(7-20)


 


Creatinine


 


 0.8 mg/dL


(0.6-1.0)


 


Estimated GFR


(Cockcroft-Gault) 


 101.2  





 


BUN/Creatinine Ratio  14 (6-20)  


 


Glucose Level


 


 91 mg/dL


(70-99)


 


Calcium Level


 


 9.1 mg/dL


(8.5-10.1)


 


Total Bilirubin


 


 0.2 mg/dL


(0.2-1.0)


 


Aspartate Amino Transferase


(AST) 


 14 U/L (15-37)


L


 


Alanine Aminotransferase (ALT)


 


 10 U/L (14-59)


L


 


Alkaline Phosphatase


 


 37 U/L


()  L


 


Total Protein


 


 7.5 g/dL


(6.4-8.2)


 


Albumin


 


 3.5 g/dL


(3.4-5.0)


 


Albumin/Globulin Ratio


 


 0.9 (1.0-1.7)


L





                                Laboratory Tests


19 08:10








                                Laboratory Tests


19 08:10











EKG


EKG


[]





Radiology/Procedures


Radiology/Procedures


[][facility]


                    [address] [place] [address]


                                 [phone]


                                        


                                 IMAGING REPORT





                                     Signed





PATIENT: JEREMIAS HILL ACCOUNT: [id number]     MRN#: [id number]


: 1990           LOCATION: ER              AGE: 29


SEX: F                    EXAM DT: 19         ACCESSION#: [id number]


STATUS: REG ER            ORD. PHYSICIAN: JACLYN MCKEON DO


REASON: abdominal pain, recent abdominal surgery


PROCEDURE: CT ABD PELV W/ IV CONTRST ONLY





EXAM: CT Abdomen and Pelvis with IV contrast


 


CLINICAL HISTORY: Abdominal pain, recent abdominal surgery.


 


COMPARISON: none


 


TECHNIQUE: Helical CT of the abdomen and pelvis was performed following 


the administration of intravenous contrast.  Axial, coronal and sagittal 


reformatted images were generated.


 


PQRS compliance statement - One or more of the following individualized 


dose reduction techniques were utilized for this study:


1.  Automated exposure control


2.  Adjustment of the mA and/or kV according to patient size


3.  Use of iterative reconstruction technique


 


FINDINGS:


Lower chest: 


Lung bases are clear.


 


Abdomen and Pelvis: 


No focal liver lesion. Gallbladder is normal. No biliary ductal 


dilatation. Spleen is unremarkable.


 


Adrenal glands and pancreas are unremarkable.


 


Symmetric nephrograms.  No focal renal lesion. No hydronephrosis.  Bladder


is unremarkable.


 


Appendix is normal.  No small or large bowel dilatation. Moderate colonic 


stool content. 


 


Small volume free pelvic fluid. Moderate colonic stool content is seen. 


Endometrial prominence can be correlated with phase of menstrual cycle.


 


Drain is seen within the subcutaneous soft tissues. There is asymmetric 


thickening of the left rectus abdominis muscle with associated 


heterogeneous low density, particularly inferiorly, favor rectus hematoma.


 


No abdominal or pelvic lymphadenopathy.


 


Bones: 


Trabeculated lesion L3, likely hemangioma. Small fat-containing 


periumbilical hernia.


 


IMPRESSION: 


1.  Asymmetric thickening and heterogeneous density of the left rectus 


abdominis muscle likely from rectus hematoma. Drain is seen within the 


overlying subcutaneous soft tissues.


2.  Small volume free pelvic fluid, possibly postsurgical.


 


Electronically signed by: Louis Mauro MD (2019 10:22 AM) [email or web address]














DICTATED and SIGNED BY:     LOUIS MAURO MD


DATE:     19 1022





Course & Med Decision Making


Course & Med Decision Making


Pertinent Labs and Imaging studies reviewed. (See chart for details)





[]





Dragon Disclaimer


Dragon Disclaimer


This electronic medical record was generated, in whole or in part, using a voice

 recognition dictation system.





Departure


Departure


Impression:  


   Primary Impression:  


   Postoperative abdominal pain


   Additional Impression:  


   Constipation


Disposition:  01 HOME, SELF-CARE


Condition:  IMPROVED


Referrals:  


LANDON ROPER MD


PLEASE CALL YOUR SURGEON FOR FOLLOW UP THIS WEEK.


Patient Instructions:  Hematoma, Pain Relief Preoperatively and Postoperatively





Problem Qualifiers











JACLYN MCKEON DO                Dec 23, 2019 08:52

## 2019-12-23 NOTE — RAD
EXAM: CT Abdomen and Pelvis with IV contrast

 

CLINICAL HISTORY: Abdominal pain, recent abdominal surgery.

 

COMPARISON: none

 

TECHNIQUE: Helical CT of the abdomen and pelvis was performed following 

the administration of intravenous contrast.  Axial, coronal and sagittal 

reformatted images were generated.

 

PQRS compliance statement - One or more of the following individualized 

dose reduction techniques were utilized for this study:

1.  Automated exposure control

2.  Adjustment of the mA and/or kV according to patient size

3.  Use of iterative reconstruction technique

 

FINDINGS:

Lower chest: 

Lung bases are clear.

 

Abdomen and Pelvis: 

No focal liver lesion. Gallbladder is normal. No biliary ductal 

dilatation. Spleen is unremarkable.

 

Adrenal glands and pancreas are unremarkable.

 

Symmetric nephrograms.  No focal renal lesion. No hydronephrosis.  Bladder

is unremarkable.

 

Appendix is normal.  No small or large bowel dilatation. Moderate colonic 

stool content. 

 

Small volume free pelvic fluid. Moderate colonic stool content is seen. 

Endometrial prominence can be correlated with phase of menstrual cycle.

 

Drain is seen within the subcutaneous soft tissues. There is asymmetric 

thickening of the left rectus abdominis muscle with associated 

heterogeneous low density, particularly inferiorly, favor rectus hematoma.

 

No abdominal or pelvic lymphadenopathy.

 

Bones: 

Trabeculated lesion L3, likely hemangioma. Small fat-containing 

periumbilical hernia.

 

IMPRESSION: 

1.  Asymmetric thickening and heterogeneous density of the left rectus 

abdominis muscle likely from rectus hematoma. Drain is seen within the 

overlying subcutaneous soft tissues.

2.  Small volume free pelvic fluid, possibly postsurgical.

 

Electronically signed by: Louis Parmar MD (12/23/2019 10:22 AM) [id number]

## 2020-01-08 ENCOUNTER — HOSPITAL ENCOUNTER (EMERGENCY)
Dept: HOSPITAL 61 - ER | Age: 30
LOS: 1 days | Discharge: HOME | End: 2020-01-09
Payer: COMMERCIAL

## 2020-01-08 VITALS — WEIGHT: 153 LBS | BODY MASS INDEX: 26.12 KG/M2 | HEIGHT: 64 IN
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
## 2020-01-08 DIAGNOSIS — Z88.1: ICD-10-CM
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
## 2020-01-08 DIAGNOSIS — Z88.2: ICD-10-CM

## 2020-01-08 DIAGNOSIS — N83.201: ICD-10-CM

## 2020-01-08 DIAGNOSIS — G89.29: ICD-10-CM

## 2020-01-08 DIAGNOSIS — T85.698A: Primary | ICD-10-CM

## 2020-01-08 LAB
ALBUMIN SERPL-MCNC: 3.4 G/DL (ref 3.4–5)
ALBUMIN/GLOB SERPL: 0.9 {RATIO} (ref 1–1.7)
ALP SERPL-CCNC: 41 U/L (ref 46–116)
ALT SERPL-CCNC: 8 U/L (ref 14–59)
ANION GAP SERPL CALC-SCNC: 9 MMOL/L (ref 6–14)
ANISOCYTOSIS BLD QL SMEAR: SLIGHT
AST SERPL-CCNC: 11 U/L (ref 15–37)
BASOPHILS # BLD AUTO: 0.1 X10^3/UL (ref 0–0.2)
BASOPHILS NFR BLD: 1 % (ref 0–3)
BILIRUB SERPL-MCNC: 0.1 MG/DL (ref 0.2–1)
BUN SERPL-MCNC: 15 MG/DL (ref 7–20)
BUN/CREAT SERPL: 17 (ref 6–20)
CALCIUM SERPL-MCNC: 8.8 MG/DL (ref 8.5–10.1)
CHLORIDE SERPL-SCNC: 103 MMOL/L (ref 98–107)
CO2 SERPL-SCNC: 28 MMOL/L (ref 21–32)
CREAT SERPL-MCNC: 0.9 MG/DL (ref 0.6–1)
EOSINOPHIL NFR BLD: 0.3 X10^3/UL (ref 0–0.7)
EOSINOPHIL NFR BLD: 5 % (ref 0–3)
ERYTHROCYTE [DISTWIDTH] IN BLOOD BY AUTOMATED COUNT: 19.4 % (ref 11.5–14.5)
GFR SERPLBLD BASED ON 1.73 SQ M-ARVRAT: 88.4 ML/MIN
GLOBULIN SER-MCNC: 3.7 G/DL (ref 2.2–3.8)
GLUCOSE SERPL-MCNC: 93 MG/DL (ref 70–99)
HCT VFR BLD CALC: 31.4 % (ref 36–47)
HGB BLD-MCNC: 9.9 G/DL (ref 12–15.5)
HYPOCHROMIA BLD QL SMEAR: (no result)
LYMPHOCYTES # BLD: 1.7 X10^3/UL (ref 1–4.8)
LYMPHOCYTES NFR BLD AUTO: 32 % (ref 24–48)
MCH RBC QN AUTO: 22 PG (ref 25–35)
MCHC RBC AUTO-ENTMCNC: 31 G/DL (ref 31–37)
MCV RBC AUTO: 69 FL (ref 79–100)
MICROCYTES BLD QL SMEAR: (no result)
MONO #: 0.4 X10^3/UL (ref 0–1.1)
MONOCYTES NFR BLD: 7 % (ref 0–9)
NEUT #: 2.9 X10^3/UL (ref 1.8–7.7)
NEUTROPHILS NFR BLD AUTO: 55 % (ref 31–73)
OVALOCYTES BLD QL SMEAR: (no result)
PLATELET # BLD AUTO: 392 X10^3/UL (ref 140–400)
PLATELET # BLD EST: ADEQUATE 10*3/UL
POTASSIUM SERPL-SCNC: 3.7 MMOL/L (ref 3.5–5.1)
PROT SERPL-MCNC: 7.1 G/DL (ref 6.4–8.2)
RBC # BLD AUTO: 4.52 X10^6/UL (ref 3.5–5.4)
SODIUM SERPL-SCNC: 140 MMOL/L (ref 136–145)
WBC # BLD AUTO: 5.2 X10^3/UL (ref 4–11)

## 2020-01-08 PROCEDURE — 85025 COMPLETE CBC W/AUTO DIFF WBC: CPT

## 2020-01-08 PROCEDURE — 76856 US EXAM PELVIC COMPLETE: CPT

## 2020-01-08 PROCEDURE — 36415 COLL VENOUS BLD VENIPUNCTURE: CPT

## 2020-01-08 PROCEDURE — 96374 THER/PROPH/DIAG INJ IV PUSH: CPT

## 2020-01-08 PROCEDURE — 99285 EMERGENCY DEPT VISIT HI MDM: CPT

## 2020-01-08 PROCEDURE — 80053 COMPREHEN METABOLIC PANEL: CPT

## 2020-01-08 PROCEDURE — 76830 TRANSVAGINAL US NON-OB: CPT

## 2020-01-08 PROCEDURE — 74177 CT ABD & PELVIS W/CONTRAST: CPT

## 2020-01-08 NOTE — RAD
Transabdominal and transvaginal ultrasound the palpable bullous.

 

HISTORY: Left RANDI drain came out, fluid in the pelvis on CT

 

Transabdominal ultrasound was performed. Uterus was normal in size and 

appearance. Ovaries were poorly seen transabdominally.

 

Transvaginal imaging was performed for further evaluation. There is mild 

retroflexion of the uterus. There is no uterine mass. There is fluid which

appears to be contained in the right adnexa possibly an abscess or  

seroma. Patient's had the left ovary removed. Right ovary measured 2.4 x 

3.2 x 1.9 cm. There are small follicles in the right ovary. There is flow 

in the right ovary with color imaging and Doppler. At the margin of the 

right ovary is a complex fluid collection 2.4 x 1.5 x 2.3 cm.

 

IMPRESSION:

1. Complex right ovarian cyst possibly hemorrhagic cyst.

2. Left adnexal fluid collection.

 

Electronically signed by: Silvio Bain MD (1/8/2020 11:36 PM) [facility]

## 2020-01-08 NOTE — RAD
CT abdomen pelvis with contrast.

 

HISTORY: RANDI drain came out of left abdomen

 

CT scan the abdomen pelvis was done using 75 mL Omnipaque 300 contrast. 

Comparison is made with a study from December 23. Lung bases are clear. 

There is no effusion. Liver is normal in appearance. There is no calcified

gallstone. Spleen and adrenal glands are unremarkable. Pancreas is normal 

in appearance. There is no mass or hydronephrosis in the kidneys. There is

no small bowel obstruction.  Appendix is normal. There is inflammation or 

fluid to the left of the rectum in the pelvis measuring 4.2 x 2.7 cm more 

prominent than the prior study when it measured 3.4 x 1.8 cm. There is a 2

cm cyst or follicle in the right ovary. Pelvic ultrasound could be of 

benefit, the left ovary is at the anterior margin of this fluid. There is 

mild fluid and gas and inflammation along the previous anterior abdominal 

wall drainage tube tract. There is no other abnormal fluid collection in 

the anterior abdominal wall.

 

IMPRESSION:

1. Small amount of gas and fluid along the tube tract in the superficial 

subcutaneous tissues without other superficial collections.

2. Right ovarian cyst.

3. Mild fluid to the left of the rectum behind the uterus which could be 

nonspecific fluid or seroma or a ruptured ovarian cyst, the fluid is more 

mildly more prominent than the prior study.

4. Normal appendix.

5. No bowel obstruction or other acute finding.

 

 

 

 

 

 

 

 

 

PQRS Compliance Statement:

 

One or more of the following individualized dose reduction techniques were

utilized for this examination:  

1. Automated exposure control  

2. Adjustment of the mA and/or kV according to patient size  

3. Use of iterative reconstruction technique

 

Electronically signed by: Silvio Bain MD (1/8/2020 10:17 PM) [facility]

## 2020-01-09 VITALS — SYSTOLIC BLOOD PRESSURE: 116 MMHG | DIASTOLIC BLOOD PRESSURE: 57 MMHG

## 2020-01-09 NOTE — PHYS DOC
Past Medical History


Past Medical History:  Arthritis, Other


Additional Past Medical Histor:  PP DEP,CHRONIC BACK PAIN,BULGING DISC,LICH 

IMPLANTIS, TUMOR REMOVAL


 (DONATO FERNANDEZ)


Past Surgical History:  , Other


Additional Past Surgical Histo:  mult obgyn sug w/bilat fallopian tube removal,c

sect x 4,BLOOD CLOTS/ABD


 (DONATO FERNANDEZ)


Alcohol Use:  None


Drug Use:  None


 (DONATO FERNANDEZ)





Adult General


Chief Complaint


Chief Complaint:  OTHER COMPLAINTS





HPI


HPI





Patient is a 29  year old female who presents to the ED today complaining of a 

surgical drain falling out sometime this evening. She states she had a tumor 

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

since then. She states today the RANDI drain came out. Denies any trauma. She 

states she has an appointment with her general surgeon tomorrow at 1 PM.


 (DONATO FERNANDEZ)





Review of Systems


Review of Systems





Constitutional: Denies fever or chills []


Eyes: Denies change in visual acuity, redness, or eye pain []


HENT: Denies nasal congestion or sore throat []


Respiratory: Denies cough or shortness of breath []


Cardiovascular: No additional information not addressed in HPI []


GI: RANDI drain out. Denies abdominal pain, nausea, vomiting, bloody stools or 

diarrhea []


: Denies dysuria or hematuria []


Musculoskeletal: Denies back pain or joint pain []


Integument: Denies rash or skin lesions []


Neurologic: Denies headache, focal weakness or sensory changes []








All other systems were reviewed and found to be within normal limits, except as 

documented in this note.


 (DONATO FERNANDEZ)





Current Medications


Current Medications





Current Medications








 Medications


  (Trade)  Dose


 Ordered  Sig/Annie  Start Time


 Stop Time Status Last Admin


Dose Admin


 


 Acetaminophen


  (Tylenol)  1,000 mg  1X  ONCE  20 22:15


 20 22:16 DC 20 22:14


1,000 MG


 


 Iohexol


  (Omnipaque 300


 Mg/ml)  75 ml  1X  ONCE  20 20:30


 20 20:31 DC 20 20:30


75 ML


 


 Ketorolac


 Tromethamine


  (Toradol 30mg


 Vial)  30 mg  1X  ONCE  20 00:30


 20 00:31 DC 20 00:50


30 MG





 (BRYNN WILKES DO)





Allergies


Allergies





Allergies








Coded Allergies Type Severity Reaction Last Updated Verified


 


  sulfamethoxazole Allergy Severe Swelling 19 Yes


 


  trimethoprim Allergy Severe Swelling 19 Yes





 (BRYNN WILKES DO)





Physical Exam


Physical Exam





Constitutional: Well developed, well nourished, no acute distress, non-toxic 

appearance. []


HENT: Normocephalic, atraumatic, bilateral external ears normal, oropharynx 

moist, no oral exudates, nose normal. []


Eyes: PERRLA, EOMI, conjunctiva normal, no discharge. [] 


Neck: Normal range of motion, no tenderness, supple, no stridor. [] 


Cardiovascular:Heart rate regular rhythm, no murmur []


Lungs & Thorax:  Bilateral breath sounds clear to auscultation []


Abdomen: Surgical incision noted at the bikini line, incision is well 

approximated. No signs of infection. Left lower quadrant with an open non-d

raining wound approximately 0.5 x 0.5 cm where the drain was located. There is 

no drainage in this area. No signs of infection. Bowel sounds normal, soft, no 

tenderness, no masses, no pulsatile masses. [] 


Skin: Warm, dry, no erythema, no rash. [] 


Back: No tenderness, no CVA tenderness. [] 


Extremities: No tenderness, no cyanosis, no clubbing, ROM intact, no edema. [] 


Neurologic: Alert and oriented X 3, normal motor function, normal sensory 

function, no focal deficits noted. []


Psychologic: Affect normal, judgement normal, mood normal. []


 (DONATO FERNANDEZ)





Current Patient Data


Vital Signs





                                   Vital Signs








  Date Time  Temp Pulse Resp B/P (MAP) Pulse Ox O2 Delivery O2 Flow Rate FiO2


 


20 00:55  70 18 116/57 (76) 100   


 


20 23:55      Room Air  


 


20 19:05 98.2       





 98.2       





 (BRYNN WILKES DO)


Lab Values





                                Laboratory Tests








Test


 20


20:10 20


21:05


 


White Blood Count


 5.2 x10^3/uL


(4.0-11.0) 





 


Red Blood Count


 4.52 x10^6/uL


(3.50-5.40) 





 


Hemoglobin


 9.9 g/dL


(12.0-15.5)  L 





 


Hematocrit


 31.4 %


(36.0-47.0)  L 





 


Mean Corpuscular Volume


 69 fL ()


L 





 


Mean Corpuscular Hemoglobin


 22 pg (25-35)


L 





 


Mean Corpuscular Hemoglobin


Concent 31 g/dL


(31-37) 





 


Red Cell Distribution Width


 19.4 %


(11.5-14.5)  H 





 


Platelet Count


 392 x10^3/uL


(140-400) 





 


Neutrophils (%) (Auto) 55 % (31-73)   


 


Lymphocytes (%) (Auto) 32 % (24-48)   


 


Monocytes (%) (Auto) 7 % (0-9)   


 


Eosinophils (%) (Auto) 5 % (0-3)  H 


 


Basophils (%) (Auto) 1 % (0-3)   


 


Neutrophils # (Auto)


 2.9 x10^3/uL


(1.8-7.7) 





 


Lymphocytes # (Auto)


 1.7 x10^3/uL


(1.0-4.8) 





 


Monocytes # (Auto)


 0.4 x10^3/uL


(0.0-1.1) 





 


Eosinophils # (Auto)


 0.3 x10^3/uL


(0.0-0.7) 





 


Basophils # (Auto)


 0.1 x10^3/uL


(0.0-0.2) 





 


Platelet Estimate


 Adequate


(ADEQUATE) 





 


Hypochromasia Mod   


 


Anisocytosis Slight   


 


Microcytosis Marked   


 


Ovalocytes Few   


 


Sodium Level


 


 140 mmol/L


(136-145)


 


Potassium Level


 


 3.7 mmol/L


(3.5-5.1)


 


Chloride Level


 


 103 mmol/L


()


 


Carbon Dioxide Level


 


 28 mmol/L


(21-32)


 


Anion Gap  9 (6-14)  


 


Blood Urea Nitrogen


 


 15 mg/dL


(7-20)


 


Creatinine


 


 0.9 mg/dL


(0.6-1.0)


 


Estimated GFR


(Cockcroft-Gault) 


 88.4  





 


BUN/Creatinine Ratio  17 (6-20)  


 


Glucose Level


 


 93 mg/dL


(70-99)


 


Calcium Level


 


 8.8 mg/dL


(8.5-10.1)


 


Total Bilirubin


 


 0.1 mg/dL


(0.2-1.0)  L


 


Aspartate Amino Transferase


(AST) 


 11 U/L (15-37)


L


 


Alanine Aminotransferase (ALT)


 


 8 U/L (14-59)


L


 


Alkaline Phosphatase


 


 41 U/L


()  L


 


Total Protein


 


 7.1 g/dL


(6.4-8.2)


 


Albumin


 


 3.4 g/dL


(3.4-5.0)


 


Albumin/Globulin Ratio


 


 0.9 (1.0-1.7)


L





                                Laboratory Tests


20 20:10








                                Laboratory Tests


20 21:05








 (BRYNN WILKES DO)





EKG


EKG


[]


 (DONATO FERNANDEZ)





Radiology/Procedures


Radiology/Procedures


[]PROCEDURE: PELVIS W/TV





Transabdominal and transvaginal ultrasound the palpable bullous.


 


HISTORY: Left RANDI drain came out, fluid in the pelvis on CT


 


Transabdominal ultrasound was performed. Uterus was normal in size and 


appearance. Ovaries were poorly seen transabdominally.


 


Transvaginal imaging was performed for further evaluation. There is mild 


retroflexion of the uterus. There is no uterine mass. There is fluid which


appears to be contained in the right adnexa possibly an abscess or  


seroma. Patient's had the left ovary removed. Right ovary measured 2.4 x 


3.2 x 1.9 cm. There are small follicles in the right ovary. There is flow 


in the right ovary with color imaging and Doppler. At the margin of the 


right ovary is a complex fluid collection 2.4 x 1.5 x 2.3 cm.


 


IMPRESSION:


1. Complex right ovarian cyst possibly hemorrhagic cyst.


2. Left adnexal fluid collection.


 


Electronically signed by: Silvio Bain MD (2020 11:36 PM) [facility]














DICTATED and SIGNED BY:     SILVIO BAIN MD


DATE:     20 2336


PROCEDURE: CT ABD PELV W/ IV CONTRST ONLY





CT abdomen pelvis with contrast.


 


HISTORY: RANDI drain came out of left abdomen


 


CT scan the abdomen pelvis was done using 75 mL Omnipaque 300 contrast. 


Comparison is made with a study from . Lung bases are clear. 


There is no effusion. Liver is normal in appearance. There is no calcified


gallstone. Spleen and adrenal glands are unremarkable. Pancreas is normal 


in appearance. There is no mass or hydronephrosis in the kidneys. There is


no small bowel obstruction.  Appendix is normal. There is inflammation or 


fluid to the left of the rectum in the pelvis measuring 4.2 x 2.7 cm more 


prominent than the prior study when it measured 3.4 x 1.8 cm. There is a 2


cm cyst or follicle in the right ovary. Pelvic ultrasound could be of 


benefit, the left ovary is at the anterior margin of this fluid. There is 


mild fluid and gas and inflammation along the previous anterior abdominal 


wall drainage tube tract. There is no other abnormal fluid collection in 


the anterior abdominal wall.


 


IMPRESSION:


1. Small amount of gas and fluid along the tube tract in the superficial 


subcutaneous tissues without other superficial collections.


2. Right ovarian cyst.


3. Mild fluid to the left of the rectum behind the uterus which could be 


nonspecific fluid or seroma or a ruptured ovarian cyst, the fluid is more 


mildly more prominent than the prior study.


4. Normal appendix.


5. No bowel obstruction or other acute finding.


 


 


 


 


 


 


 


 


 


PQRS Compliance Statement:


 


One or more of the following individualized dose reduction techniques were


utilized for this examination:  


1. Automated exposure control  


2. Adjustment of the mA and/or kV according to patient size  


3. Use of iterative reconstruction technique


 


Electronically signed by: Silvio Bain MD (2020 10:17 PM) [facility]














DICTATED and SIGNED BY:     SILVIO BAIN MD


DATE:     20


 (DONATO FERNANDEZ)





Course & Med Decision Making


Course & Med Decision Making


Pertinent Labs and Imaging studies reviewed. (See chart for details)





This is a 29-year-old male patient presenting to the ED today concerned her RANDI 

drain came out. She's had the RANDI drain since 2019 after left lower 

quadrant tumor removal.





Labs are negative.





CAT scan of the abdomen and pelvis noted forSmall amount of gas and fluid along 

the tube tract in the superficial subcutaneous tissues without other superficial

 collections. Right ovarian cyst. Mild fluid to the left of the rectum behind 

the uterus which could be nonspecific fluid or seroma or a ruptured ovarian 

cyst, the fluid is more mildly more prominent than the prior study.





Pelvis ultrasound noted for Complex right ovarian cyst possibly hemorrhagic 

cyst. Left adnexal fluid collection.





Results were discussed with Dr. Dorsey who requested patient to be discharged to

 home and follow-up with  tomorrow. 


 (DONATO FERNANDEZ)





Dragon Disclaimer


Dragon Disclaimer


This electronic medical record was generated, in whole or in part, using a voice

 recognition dictation system.


 (MUTUNGA,DONATO APRN)





Departure


Departure


Impression:  


   Primary Impression:  


   RANDI drain, broken


   Additional Impression:  


   Right ovarian cyst


Disposition:   HOME, SELF-CARE


Condition:  STABLE


Referrals:  


PRANAV ROSEN (PCP)








LANDON ROPER MD


follow up tomorrow


Patient Instructions:  Ovarian Cyst, Easy-to-Read





Additional Instructions:  


Please follow-up with Dr. Roper tomorrow as scheduled





Attending Signature


Attending Signature


I have reviewed the PA/NP's note and plan of care. I was available for 

consultation as needed at all times during the patient's visit in the emergency 

department. I agree with the clinical impression, plan and disposition.





Of note: [facility] reports "29 year old male"- dictation error.  Patient is 29 year 

old female. 


 (BRYNN WILKES DO)





Problem Qualifiers








   Primary Impression:  


   RANDI drain, broken


   Encounter type:  initial encounter  Qualified Codes:  T85.698A - Other 

   mechanical complication of other specified internal prosthetic devices, 

   implants and grafts, initial encounter








DONATO FERNANDEZ APRN               2020 01:01


BRYNN WILKES DO             Nilson 10, 2020 19:24

## 2020-08-04 ENCOUNTER — HOSPITAL ENCOUNTER (EMERGENCY)
Dept: HOSPITAL 61 - ER | Age: 30
Discharge: HOME | End: 2020-08-04
Payer: COMMERCIAL

## 2020-08-04 VITALS — WEIGHT: 153.22 LBS | HEIGHT: 64 IN | BODY MASS INDEX: 26.16 KG/M2

## 2020-08-04 VITALS — SYSTOLIC BLOOD PRESSURE: 106 MMHG | DIASTOLIC BLOOD PRESSURE: 61 MMHG

## 2020-08-04 DIAGNOSIS — R10.9: ICD-10-CM

## 2020-08-04 DIAGNOSIS — Z98.890: ICD-10-CM

## 2020-08-04 DIAGNOSIS — R50.9: ICD-10-CM

## 2020-08-04 DIAGNOSIS — G89.29: ICD-10-CM

## 2020-08-04 DIAGNOSIS — R30.0: Primary | ICD-10-CM

## 2020-08-04 DIAGNOSIS — Z88.8: ICD-10-CM

## 2020-08-04 DIAGNOSIS — M19.90: ICD-10-CM

## 2020-08-04 DIAGNOSIS — Z88.2: ICD-10-CM

## 2020-08-04 LAB
ALBUMIN SERPL-MCNC: 3.4 G/DL (ref 3.4–5)
ALBUMIN/GLOB SERPL: 0.7 {RATIO} (ref 1–1.7)
ALP SERPL-CCNC: 49 U/L (ref 46–116)
ALT SERPL-CCNC: 16 U/L (ref 14–59)
ANION GAP SERPL CALC-SCNC: 10 MMOL/L (ref 6–14)
ANISOCYTOSIS BLD QL SMEAR: (no result)
APTT PPP: YELLOW S
AST SERPL-CCNC: 13 U/L (ref 15–37)
BACTERIA #/AREA URNS HPF: (no result) /HPF
BASOPHILS # BLD AUTO: 0 X10^3/UL (ref 0–0.2)
BASOPHILS NFR BLD: 0 % (ref 0–3)
BILIRUB SERPL-MCNC: 0.5 MG/DL (ref 0.2–1)
BILIRUB UR QL STRIP: NEGATIVE
BUN SERPL-MCNC: 14 MG/DL (ref 7–20)
BUN/CREAT SERPL: 16 (ref 6–20)
CALCIUM SERPL-MCNC: 8.9 MG/DL (ref 8.5–10.1)
CHLORIDE SERPL-SCNC: 103 MMOL/L (ref 98–107)
CO2 SERPL-SCNC: 26 MMOL/L (ref 21–32)
CREAT SERPL-MCNC: 0.9 MG/DL (ref 0.6–1)
EOSINOPHIL NFR BLD: 0 % (ref 0–3)
EOSINOPHIL NFR BLD: 0 X10^3/UL (ref 0–0.7)
ERYTHROCYTE [DISTWIDTH] IN BLOOD BY AUTOMATED COUNT: 18.5 % (ref 11.5–14.5)
FIBRINOGEN PPP-MCNC: CLEAR MG/DL
GFR SERPLBLD BASED ON 1.73 SQ M-ARVRAT: 87.8 ML/MIN
GLOBULIN SER-MCNC: 4.6 G/DL (ref 2.2–3.8)
GLUCOSE SERPL-MCNC: 84 MG/DL (ref 70–99)
HCT VFR BLD CALC: 27.1 % (ref 36–47)
HGB BLD-MCNC: 8.5 G/DL (ref 12–15.5)
HYPOCHROMIA BLD QL SMEAR: (no result)
LIPASE: 48 U/L (ref 73–393)
LYMPHOCYTES # BLD: 1 X10^3/UL (ref 1–4.8)
LYMPHOCYTES NFR BLD AUTO: 10 % (ref 24–48)
MCH RBC QN AUTO: 22 PG (ref 25–35)
MCHC RBC AUTO-ENTMCNC: 31 G/DL (ref 31–37)
MCV RBC AUTO: 72 FL (ref 79–100)
MICROCYTES BLD QL SMEAR: (no result)
MONO #: 0.8 X10^3/UL (ref 0–1.1)
MONOCYTES NFR BLD: 7 % (ref 0–9)
NEUT #: 8.7 X10^3/UL (ref 1.8–7.7)
NEUTROPHILS NFR BLD AUTO: 83 % (ref 31–73)
NITRITE UR QL STRIP: NEGATIVE
PH UR STRIP: 5.5 [PH]
PLATELET # BLD AUTO: 325 X10^3/UL (ref 140–400)
PLATELET # BLD EST: ADEQUATE 10*3/UL
POLYCHROMASIA BLD QL SMEAR: SLIGHT
POTASSIUM SERPL-SCNC: 3.5 MMOL/L (ref 3.5–5.1)
PROT SERPL-MCNC: 8 G/DL (ref 6.4–8.2)
PROT UR STRIP-MCNC: 30 MG/DL
RBC # BLD AUTO: 3.78 X10^6/UL (ref 3.5–5.4)
RBC #/AREA URNS HPF: (no result) /HPF (ref 0–2)
SODIUM SERPL-SCNC: 139 MMOL/L (ref 136–145)
SQUAMOUS #/AREA URNS LPF: (no result) /LPF
TOXIC GRANULES BLD QL SMEAR: SLIGHT
UROBILINOGEN UR-MCNC: 0.2 MG/DL
WBC # BLD AUTO: 10.5 X10^3/UL (ref 4–11)
WBC #/AREA URNS HPF: >40 /HPF (ref 0–4)

## 2020-08-04 PROCEDURE — 96372 THER/PROPH/DIAG INJ SC/IM: CPT

## 2020-08-04 PROCEDURE — 99284 EMERGENCY DEPT VISIT MOD MDM: CPT

## 2020-08-04 PROCEDURE — 83690 ASSAY OF LIPASE: CPT

## 2020-08-04 PROCEDURE — 81025 URINE PREGNANCY TEST: CPT

## 2020-08-04 PROCEDURE — 74176 CT ABD & PELVIS W/O CONTRAST: CPT

## 2020-08-04 PROCEDURE — 96374 THER/PROPH/DIAG INJ IV PUSH: CPT

## 2020-08-04 PROCEDURE — 80053 COMPREHEN METABOLIC PANEL: CPT

## 2020-08-04 PROCEDURE — 85025 COMPLETE CBC W/AUTO DIFF WBC: CPT

## 2020-08-04 PROCEDURE — 81001 URINALYSIS AUTO W/SCOPE: CPT

## 2020-08-04 PROCEDURE — 87086 URINE CULTURE/COLONY COUNT: CPT

## 2020-08-04 PROCEDURE — 36415 COLL VENOUS BLD VENIPUNCTURE: CPT

## 2020-08-04 NOTE — PHYS DOC
Past Medical History


Past Medical History:  Arthritis, Endometriosis, Other


Additional Past Medical Histor:  PP DEP,CHRONIC BACK PAIN,BULGING DISC,LICH 

IMPLANTIS, TUMOR REMOVAL


Past Surgical History:  , Other


Additional Past Surgical Histo:  mult obgyn sug w/bilat fallopian tube removal,c

sect x 4,BLOOD CLOTS/ABD


Smoking Status:  Never Smoker


Alcohol Use:  None


Drug Use:  None





General Adult


EDM:


Chief Complaint:  URINARY FREQUENCY





HPI:


HPI:





The history was obtained from the patient.  Patient is a 30-year-old female with

PMH partial obstructing, endometriosis, frequent UTIs who presents with a chief 

complaint of dysuria.  Patient states she was diagnosed with a urinary tract 

infection by her primary care physician yesterday.  States she was prescribed 

Augmentin for 7 days.  She states she has taken 2 of her tablets and continues 

to feel worse.  She notes she had a temperature of 102.3 yesterday.  She also 

notes temperature of 102.02 hours prior to arrival.  She states he did take 

Tylenol 2-hour prior to arrival.  She denies any vomiting but notes nausea.  

States he has discomfort in her left lower back and left lower quadrant.  She 

denies any vaginal bleeding or discharge.  She denies any changes to her stool 

caliber or consistency.  Denies any diarrhea or blood in the stool.  She denies 

any syncope.  She states she does get frequent UTIs and this feels similar.  She

states that she has not been taking Pyridium.  No other complaints.





Review of Systems:


Review of Systems:


Constitutional:   Positive for fevers


Eyes:   Denies change in visual acuity. []


HENT:   Denies nasal congestion or sore throat. [] 


Respiratory:   Denies cough or shortness of breath. [] 


Cardiovascular:   Denies chest pain or edema. [] 


GI:   Denies abdominal pain, nausea, vomiting, bloody stools or diarrhea. [] 


: Positive for dysuria


Musculoskeletal:   Positive for flank pain


Integument:   Denies rash. [] 


Neurologic:   Denies headache, focal weakness or sensory changes. [] 


Endocrine:   Denies polyuria or polydipsia. [] 


Lymphatic:  Denies swollen glands. [] 


Psychiatric:  Denies depression or anxiety. []





Heart Score:


Risk Factors:


Risk Factors:  DM, Current or recent (<one month) smoker, HTN, HLP, family histo

ry of CAD, obesity.


Risk Scores:


Score 0 - 3:  2.5% MACE over next 6 weeks - Discharge Home


Score 4 - 6:  20.3% MACE over next 6 weeks - Admit for Clinical Observation


Score 7 - 10:  72.7% MACE over next 6 weeks - Early Invasive Strategies





Current Medications:





Current Medications








 Medications


  (Trade)  Dose


 Ordered  Sig/Annie  Start Time


 Stop Time Status Last Admin


Dose Admin


 


 Cefazolin Sodium


  (Ancef)  1 gm  1X  ONCE  20 13:15


 20 13:16 DC 20 13:23


1 GM


 


 Ketorolac


 Tromethamine


  (Toradol 30mg


 Vial)  30 mg  1X  ONCE  20 11:30


 20 11:31 DC 20 12:44


30 MG











Allergies:


Allergies:





Allergies








Coded Allergies Type Severity Reaction Last Updated Verified


 


  sulfamethoxazole Allergy Severe Swelling 19 Yes


 


  trimethoprim Allergy Severe Swelling 19 Yes











Physical Exam:


PE:





Constitutional: Well developed, well nourished, no acute distress, non-toxic 

appearance. []


HENT: Normocephalic, atraumatic, bilateral external ears normal, oropharynx 

moist, no oral exudates, nose normal. []


Eyes: PERRLA, EOMI, conjunctiva normal, no discharge. [] 


Neck: Normal range of motion, no tenderness, supple, no stridor. [] 


Cardiovascular:Heart rate regular rhythm, no murmur []


Lungs & Thorax:  Bilateral breath sounds clear to auscultation []


Abdomen: soft, no tenderness, no masses, no pulsatile masses. [] 


Skin: Warm, dry, no erythema, no rash. [] 


Back: Mild to moderate left CVA tenderness.


Extremities: No tenderness, no cyanosis, no clubbing, ROM intact, no edema. [] 


Neurologic: Alert and oriented X 3, normal motor function, normal sensory 

function, no focal deficits noted. []


Psychologic: Affect normal, judgement normal, mood normal. []





Current Patient Data:


Labs:





                                Laboratory Tests








Test


 20


10:25 20


10:44 20


12:22


 


Urine Collection Type Void    


 


Urine Color Yellow    


 


Urine Clarity Clear    


 


Urine pH


 5.5 (<5.0-8.0)


 


 





 


Urine Specific Gravity


 1.015


(1.000-1.030) 


 





 


Urine Protein


 30 mg/dL


(NEG-TRACE) 


 





 


Urine Glucose (UA)


 Negative mg/dL


(NEG) 


 





 


Urine Ketones (Stick)


 40 mg/dL (NEG)


 


 





 


Urine Blood


 Moderate (NEG)


 


 





 


Urine Nitrite


 Negative (NEG)


 


 





 


Urine Bilirubin


 Negative (NEG)


 


 





 


Urine Urobilinogen Dipstick


 0.2 mg/dL (0.2


mg/dL) 


 





 


Urine Leukocyte Esterase Small (NEG)    


 


Urine RBC


 1-2 /HPF (0-2)


 


 





 


Urine WBC


 >40 /HPF (0-4)


 


 





 


Urine Squamous Epithelial


Cells Mod /LPF  


 


 





 


Urine Bacteria


 Few /HPF


(0-FEW) 


 





 


Urine Mucus Mod /LPF    


 


POC Urine HCG, Qualitative


 


 Hcg negative


(Negative) 





 


White Blood Count


 


 


 10.5 x10^3/uL


(4.0-11.0)


 


Red Blood Count


 


 


 3.78 x10^6/uL


(3.50-5.40)


 


Hemoglobin


 


 


 8.5 g/dL


(12.0-15.5)  L


 


Hematocrit


 


 


 27.1 %


(36.0-47.0)  L


 


Mean Corpuscular Volume


 


 


 72 fL ()


L


 


Mean Corpuscular Hemoglobin


 


 


 22 pg (25-35)


L


 


Mean Corpuscular Hemoglobin


Concent 


 


 31 g/dL


(31-37)


 


Red Cell Distribution Width


 


 


 18.5 %


(11.5-14.5)  H


 


Platelet Count


 


 


 325 x10^3/uL


(140-400)


 


Neutrophils (%) (Auto)   83 % (31-73)  H


 


Lymphocytes (%) (Auto)   10 % (24-48)  L


 


Monocytes (%) (Auto)   7 % (0-9)  


 


Eosinophils (%) (Auto)   0 % (0-3)  


 


Basophils (%) (Auto)   0 % (0-3)  


 


Neutrophils # (Auto)


 


 


 8.7 x10^3/uL


(1.8-7.7)  H


 


Lymphocytes # (Auto)


 


 


 1.0 x10^3/uL


(1.0-4.8)


 


Monocytes # (Auto)


 


 


 0.8 x10^3/uL


(0.0-1.1)


 


Eosinophils # (Auto)


 


 


 0.0 x10^3/uL


(0.0-0.7)


 


Basophils # (Auto)


 


 


 0.0 x10^3/uL


(0.0-0.2)


 


Platelet Estimate   Pending  


 


Sodium Level


 


 


 139 mmol/L


(136-145)


 


Potassium Level


 


 


 3.5 mmol/L


(3.5-5.1)


 


Chloride Level


 


 


 103 mmol/L


()


 


Carbon Dioxide Level


 


 


 26 mmol/L


(21-32)


 


Anion Gap   10 (6-14)  


 


Blood Urea Nitrogen


 


 


 14 mg/dL


(7-20)


 


Creatinine


 


 


 0.9 mg/dL


(0.6-1.0)


 


Estimated GFR


(Cockcroft-Gault) 


 


 87.8  





 


BUN/Creatinine Ratio   16 (6-20)  


 


Glucose Level


 


 


 84 mg/dL


(70-99)


 


Calcium Level


 


 


 8.9 mg/dL


(8.5-10.1)


 


Total Bilirubin


 


 


 0.5 mg/dL


(0.2-1.0)


 


Aspartate Amino Transferase


(AST) 


 


 13 U/L (15-37)


L


 


Alanine Aminotransferase (ALT)


 


 


 16 U/L (14-59)





 


Alkaline Phosphatase


 


 


 49 U/L


()


 


Total Protein


 


 


 8.0 g/dL


(6.4-8.2)


 


Albumin


 


 


 3.4 g/dL


(3.4-5.0)


 


Albumin/Globulin Ratio


 


 


 0.7 (1.0-1.7)


L


 


Lipase


 


 


 48 U/L


()  L





                                Laboratory Tests


20 12:22








                                Laboratory Tests


20 12:22








Vital Signs:





                                   Vital Signs








  Date Time  Temp Pulse Resp B/P (MAP) Pulse Ox O2 Delivery O2 Flow Rate FiO2


 


20 10:25 98.5 108 20 123/67 (85) 97 Room Air  





 98.5       











EKG:


EKG:


[]





Radiology/Procedures:


Radiology/Procedures:


[][facility]


                    [address]  [place], [place] [id number] [phone]


                                        


                                 IMAGING REPORT





                                     Signed





PATIENT: JEREMIAS HILL ACCOUNT: [id number]     MRN#: [id number]


: 1990           LOCATION: ER              AGE: 30


SEX: F                    EXAM DT: 20         ACCESSION#: [id number]


STATUS: REG ER            ORD. PHYSICIAN: ANI CALDERA DO


REASON: Left flank pain x 2 days


PROCEDURE: CT ABDOMEN PELVIS WO CONTRAST





Study: CT abdomen/pelvis without intravenous contrast


 


Indication: Left flank pain. 


 


Comparison: Most recently on 2020


 


Technique: Helical CT imaging performed of the abdomen and pelvis without 


the use of intravenous contrast. Sagittal and coronal reformats were 


obtained. 


 


One or more of the following individualized dose reduction techniques were


utilized for this examination:  


 


1. Automated exposure control


2. Adjustment of the mA and/or kV according to patient size


3. Use of iterative reconstruction technique.


 


Findings:


 


Inherently limited evaluation without intravenous contrast.


 


No newly seen abnormality at the lower chest.


 


Within normal limits liver. No calcified gallstones. No peripancreatic 


inflammation. The spleen is within normal limits for size. No adrenal 


gland mass.


 


Similar attenuation and size of both kidneys. No hydroureteronephrosis. No


obstructing stone or mass is seen. Within normal limits urinary bladder.


 


No discrete abnormality of the uterus. The ovaries and any potential 


ovarian cyst are difficult to differentiate from adjacent small bowel.


 


The bowel is incompletely evaluated due to the absence of oral contrast. 


No pathologic dilatation to suggest obstruction. No pneumatosis or 


perforation. Mild volume well-formed stool burden within portions of the 


colon. The appendix is normal. Incompletely evaluated stomach due to 


underdistention.


 


Normal aortic caliber. There appears to be small volume free fluid within 


the deep pelvis such as seen to the left the rectum. No free air. No 


appreciable lymphadenopathy. Ventral scarring of the superficial soft 


tissues at the lower pelvis. Previously seen subcutaneous gas has 


resolved.


 


L3 vertebral body hemangioma. No acute osseous abnormality.


 


Impression:


 


1.  No acute abnormality of the kidneys or collecting system on either 


side to account for the patient's symptoms.


2.  Within the pelvis it is difficult to distinguish the ovaries from 


adjacent bowel given the absence of oral and intravenous contrast. There 


does appear to be a small amount of free fluid within the pelvis but 


similar to the comparison and often physiologic in a patient of this age. 


No inflammatory changes are well identified within the pelvis that would 


suggest a pathologic process in this region.


 


Electronically signed by: MURALI SCHMIDT MD (2020 12:27 PM) [id number]














DICTATED and SIGNED BY:     MURALI SCHMIDT MD


DATE:     20 1227





Course & Med Decision Making:


Course & Med Decision Making


Pertinent Labs and Imaging studies reviewed. (See chart for details)





Patient is a very pleasant 30-year-old female who presents with chief complaint 

of left flank pain with dysuria.  Initial vital signs normal for mild tac

hycardia.  Afebrile.  Exam noted above.  Given the patient's persistent 

temperature CT imaging of the abdomen pelvis was obtained.  No signs of 

associated kidney stone.  Urine does show some evidence of infection.  She was 

given 1 g of IV Ancef while in the emergency department.  She is also given T

oradol and 1 Norco tablet.  Remainder of labs unremarkable.  I did discuss the 

possibility of hospitalization versus outpatient management the patient.  She is

 requesting outpatient management.  Overall do feel this is reasonable.  Vital 

signs have improved throughout her stay.  She is no longer tachycardic.  

Normotensive.  She was initially started on 7 days of Augmentin.  Given her 

clinical signs and symptoms concerning for pyelonephritis she will be 

transitioned to oral Keflex for 14 days.  Strict 12-24 return precautions were 

given to which she expressed understanding.  She was instructed follow-up with 

her primary care physician in the next 2 to 3 days.  She is agreeable to 

discharge home with close monitoring.





Dragon Disclaimer:


Dragon Disclaimer:


This electronic medical record was generated, in whole or in part, using a voice

 recognition dictation system.





Departure


Departure


Disposition:   HOME, SELF-CARE


Condition:  GOOD


Referrals:  


PRANAV ROSEN (PCP)


Patient Instructions:  Pyelonephritis, Adult





Additional Instructions:  


Please return to the emergency department in 12 to 24 hours should her symptoms 

not improve or worsen.  Please follow-up with your primary care physician in the

 next 2 to 3 days.  Please discontinue the Augmentin and start taking the 

Keflex.


Scripts


Ibuprofen (IBUPROFEN) 600 Mg Tablet


600 MG PO PRN Q6HRS PRN for PAIN, #20 TAB


   take with food or milk


   Prov: ANI CALDERA DO         20 


Cephalexin (KEFLEX) 500 Mg Capsule


500 MG PO BID for 14 Days, #28 CAP


   Prov: ANI CALDERA DO         20





Justicifation of Admission Dx:


Justifications for Admission:


Justification of Admission Dx:  N/A











ANI CALDERA DO           Aug 4, 2020 13:31

## 2020-08-04 NOTE — RAD
Study: CT abdomen/pelvis without intravenous contrast

 

Indication: Left flank pain. 

 

Comparison: Most recently on 1/8/2020

 

Technique: Helical CT imaging performed of the abdomen and pelvis without 

the use of intravenous contrast. Sagittal and coronal reformats were 

obtained. 

 

One or more of the following individualized dose reduction techniques were

utilized for this examination:  

 

1. Automated exposure control

2. Adjustment of the mA and/or kV according to patient size

3. Use of iterative reconstruction technique.

 

Findings:

 

Inherently limited evaluation without intravenous contrast.

 

No newly seen abnormality at the lower chest.

 

Within normal limits liver. No calcified gallstones. No peripancreatic 

inflammation. The spleen is within normal limits for size. No adrenal 

gland mass.

 

Similar attenuation and size of both kidneys. No hydroureteronephrosis. No

obstructing stone or mass is seen. Within normal limits urinary bladder.

 

No discrete abnormality of the uterus. The ovaries and any potential 

ovarian cyst are difficult to differentiate from adjacent small bowel.

 

The bowel is incompletely evaluated due to the absence of oral contrast. 

No pathologic dilatation to suggest obstruction. No pneumatosis or 

perforation. Mild volume well-formed stool burden within portions of the 

colon. The appendix is normal. Incompletely evaluated stomach due to 

underdistention.

 

Normal aortic caliber. There appears to be small volume free fluid within 

the deep pelvis such as seen to the left the rectum. No free air. No 

appreciable lymphadenopathy. Ventral scarring of the superficial soft 

tissues at the lower pelvis. Previously seen subcutaneous gas has 

resolved.

 

L3 vertebral body hemangioma. No acute osseous abnormality.

 

Impression:

 

1.  No acute abnormality of the kidneys or collecting system on either 

side to account for the patient's symptoms.

2.  Within the pelvis it is difficult to distinguish the ovaries from 

adjacent bowel given the absence of oral and intravenous contrast. There 

does appear to be a small amount of free fluid within the pelvis but 

similar to the comparison and often physiologic in a patient of this age. 

No inflammatory changes are well identified within the pelvis that would 

suggest a pathologic process in this region.

 

Electronically signed by: MURALI SCHMIDT MD (8/4/2020 12:27 PM) [id number]

## 2021-05-23 ENCOUNTER — HOSPITAL ENCOUNTER (EMERGENCY)
Dept: HOSPITAL 61 - ER | Age: 31
LOS: 1 days | Discharge: HOME | End: 2021-05-24
Payer: COMMERCIAL

## 2021-05-23 VITALS — WEIGHT: 136.69 LBS | BODY MASS INDEX: 23.34 KG/M2 | HEIGHT: 64 IN

## 2021-05-23 DIAGNOSIS — R20.2: ICD-10-CM

## 2021-05-23 DIAGNOSIS — Z88.2: ICD-10-CM

## 2021-05-23 DIAGNOSIS — Z88.1: ICD-10-CM

## 2021-05-23 DIAGNOSIS — R07.89: Primary | ICD-10-CM

## 2021-05-23 DIAGNOSIS — R42: ICD-10-CM

## 2021-05-23 LAB
ALBUMIN SERPL-MCNC: 3.9 G/DL (ref 3.4–5)
ALBUMIN/GLOB SERPL: 1.1 {RATIO} (ref 1–1.7)
ALP SERPL-CCNC: 44 U/L (ref 46–116)
ALT SERPL-CCNC: 18 U/L (ref 14–59)
ANION GAP SERPL CALC-SCNC: 7 MMOL/L (ref 6–14)
ANISOCYTOSIS BLD QL SMEAR: (no result)
AST SERPL-CCNC: 16 U/L (ref 15–37)
BASOPHILS # BLD AUTO: 0 X10^3/UL (ref 0–0.2)
BASOPHILS NFR BLD: 1 % (ref 0–3)
BILIRUB SERPL-MCNC: 0.2 MG/DL (ref 0.2–1)
BUN SERPL-MCNC: 14 MG/DL (ref 7–20)
BUN/CREAT SERPL: 18 (ref 6–20)
CALCIUM SERPL-MCNC: 8.7 MG/DL (ref 8.5–10.1)
CHLORIDE SERPL-SCNC: 106 MMOL/L (ref 98–107)
CO2 SERPL-SCNC: 27 MMOL/L (ref 21–32)
CREAT SERPL-MCNC: 0.8 MG/DL (ref 0.6–1)
EOSINOPHIL NFR BLD: 0.1 X10^3/UL (ref 0–0.7)
EOSINOPHIL NFR BLD: 3 % (ref 0–3)
ERYTHROCYTE [DISTWIDTH] IN BLOOD BY AUTOMATED COUNT: 22.5 % (ref 11.5–14.5)
GFR SERPLBLD BASED ON 1.73 SQ M-ARVRAT: 100 ML/MIN
GLUCOSE SERPL-MCNC: 118 MG/DL (ref 70–99)
HCT VFR BLD CALC: 29 % (ref 36–47)
HGB BLD-MCNC: 9.1 G/DL (ref 12–15.5)
HYPOCHROMIA BLD QL SMEAR: SLIGHT
LYMPHOCYTES # BLD: 1.4 X10^3/UL (ref 1–4.8)
LYMPHOCYTES NFR BLD AUTO: 28 % (ref 24–48)
MCH RBC QN AUTO: 23 PG (ref 25–35)
MCHC RBC AUTO-ENTMCNC: 32 G/DL (ref 31–37)
MCV RBC AUTO: 74 FL (ref 79–100)
MICROCYTES BLD QL SMEAR: SLIGHT
MONO #: 0.3 X10^3/UL (ref 0–1.1)
MONOCYTES NFR BLD: 6 % (ref 0–9)
NEUT #: 3.1 X10^3/UL (ref 1.8–7.7)
NEUTROPHILS NFR BLD AUTO: 63 % (ref 31–73)
PLATELET # BLD AUTO: 187 X10^3/UL (ref 140–400)
PLATELET # BLD EST: ADEQUATE 10*3/UL
POTASSIUM SERPL-SCNC: 3.7 MMOL/L (ref 3.5–5.1)
PROT SERPL-MCNC: 7.3 G/DL (ref 6.4–8.2)
RBC # BLD AUTO: 3.91 X10^6/UL (ref 3.5–5.4)
SODIUM SERPL-SCNC: 140 MMOL/L (ref 136–145)
WBC # BLD AUTO: 4.9 X10^3/UL (ref 4–11)

## 2021-05-23 PROCEDURE — 36415 COLL VENOUS BLD VENIPUNCTURE: CPT

## 2021-05-23 PROCEDURE — 93005 ELECTROCARDIOGRAM TRACING: CPT

## 2021-05-23 PROCEDURE — 81025 URINE PREGNANCY TEST: CPT

## 2021-05-23 PROCEDURE — 96361 HYDRATE IV INFUSION ADD-ON: CPT

## 2021-05-23 PROCEDURE — 99285 EMERGENCY DEPT VISIT HI MDM: CPT

## 2021-05-23 PROCEDURE — 80053 COMPREHEN METABOLIC PANEL: CPT

## 2021-05-23 PROCEDURE — 71045 X-RAY EXAM CHEST 1 VIEW: CPT

## 2021-05-23 PROCEDURE — 84484 ASSAY OF TROPONIN QUANT: CPT

## 2021-05-23 PROCEDURE — 84703 CHORIONIC GONADOTROPIN ASSAY: CPT

## 2021-05-23 PROCEDURE — 96374 THER/PROPH/DIAG INJ IV PUSH: CPT

## 2021-05-23 PROCEDURE — 70450 CT HEAD/BRAIN W/O DYE: CPT

## 2021-05-23 PROCEDURE — 85025 COMPLETE CBC W/AUTO DIFF WBC: CPT

## 2021-05-23 PROCEDURE — 85379 FIBRIN DEGRADATION QUANT: CPT

## 2021-05-23 NOTE — EKG
[facility]

              [address] [place] [address]

Test Date:    2021               Test Time:    21:06:00

Pat Name:     JEREMIAS HILL           Department:   

Patient ID:   [id number]           Room:          

Gender:       F                        Technician:   

:          1990               Requested By: TALI CASTAÑEDA

Order Number: [id number]           Reading MD:     

                                 Measurements

Intervals                              Axis          

Rate:         71                       P:            52

FL:           162                      QRS:          15

QRSD:         74                       T:            0

QT:           402                                    

QTc:          442                                    

                           Interpretive Statements

SINUS RHYTHM

NORMAL ECG

RI6.01

No previous ECG available for comparison

## 2021-05-23 NOTE — PHYS DOC
Past Medical History


Past Medical History:  Other


Additional Past Medical Histor:  Licken Planus, Psoriasis, Excema


Past Surgical History:  


Additional Past Surgical Histo:  ectopic pregnancy, Left tube and ovary removal,

endometreosis tumor remova


Smoking Status:  Never Smoker


Alcohol Use:  None


Drug Use:  None





General Adult


EDM:


Chief Complaint:  CHEST PAIN





HPI:


HPI:





Patient is a 31  year old female with no past medical history presents with a 

chief complaint of chest pain and dizziness.  Patient states around 1900 hrs. 

she started to have chest pain across her chest.  She states pain does not 

radiate.  The pain pain comes and goes.  She describes pain as a pressure which 

is exacerbated with deep breaths.   She states she has associated numbness and 

tingling in her right arm.  . Patient denies any shortness of breath.








Patient does state that she has had dizziness lightheadedness and headache since

Thursday.  Patient states she has had associated blurry vision.  Symptoms come 

and go.  Patient states on Friday she was walking up the stairs became dizzy and

collapsed to the ground.  Patient states she did not lose consciousness.





Review of Systems:


Review of Systems:


Constitutional:   Denies fever or chills. []


Eyes:   Denies change in visual acuity. []


HENT:   Denies nasal congestion or sore throat. [] 


Respiratory:   Denies cough or shortness of breath. [] 


Cardiovascular:   Positive chest pain 


GI:   Denies abdominal pain, nausea, vomiting, bloody stools or diarrhea. [] 


:  Denies dysuria. [] 


Musculoskeletal:   Denies back pain or joint pain. [] 


Integument:   Denies rash. [] 


Neurologic:   Denies headache, focal weakness or sensory changes. [Positive 

paresthesia Positive Dizziness] 


Endocrine:   Denies polyuria or polydipsia. [] 


Lymphatic:  Denies swollen glands. [] 


Psychiatric:  Denies depression or anxiety. []





Heart Score:


C/O Chest Pain:  Yes


HEART Score for Chest Pain:  








HEART Score for Chest Pain Response (Comments) Value


 


History Slighlty/Non-Suspicious 0


 


ECG Normal 0


 


Age < 45 0


 


Risk Factors No Risk Factors 0


 


Troponin < Normal Limit 0


 


Total  0








Risk Factors:


Risk Factors:  DM, Current or recent (<one month) smoker, HTN, HLP, family 

history of CAD, obesity.


Risk Scores:


Score 0 - 3:  2.5% MACE over next 6 weeks - Discharge Home


Score 4 - 6:  20.3% MACE over next 6 weeks - Admit for Clinical Observation


Score 7 - 10:  72.7% MACE over next 6 weeks - Early Invasive Strategies





Allergies:


Allergies:





Allergies








Coded Allergies Type Severity Reaction Last Updated Verified


 


  sulfamethoxazole Allergy Severe Swelling 19 Yes


 


  trimethoprim Allergy Severe Swelling 19 Yes











Physical Exam:


PE:





Constitutional: Well developed, well nourished, no acute distress, non-toxic 

appearance. []


HENT: Normocephalic, atraumatic, bilateral external ears normal, oropharynx 

moist, no oral exudates, nose normal. []


Eyes: PERRLA, EOMI, conjunctiva normal, no discharge. [] 


Neck: Normal range of motion, no tenderness, supple, no stridor. [] 


Cardiovascular:Heart rate regular rhythm, no murmur []


Lungs & Thorax:  Bilateral breath sounds clear to auscultation []


Abdomen: Bowel sounds normal, soft, no tenderness, no masses, no pulsatile 

masses. [] 


Skin: Warm, dry, no erythema, no rash. [] 


Back: No tenderness, no CVA tenderness. [] 


Extremities: No tenderness, no cyanosis, no clubbing, ROM intact, no edema. [] 


Neurologic: Alert and oriented X 3, normal motor function, normal sensory 

function, no focal deficits noted. []


Psychologic: Affect normal, judgement normal, mood normal. []





Current Patient Data:


Labs:





                                Laboratory Tests








Test


 21


21:30


 


White Blood Count


 4.9 x10^3/uL


(4.0-11.0)


 


Red Blood Count


 3.91 x10^6/uL


(3.50-5.40)


 


Hemoglobin


 9.1 g/dL


(12.0-15.5)  L


 


Hematocrit


 29.0 %


(36.0-47.0)  L


 


Mean Corpuscular Volume


 74 fL ()


L


 


Mean Corpuscular Hemoglobin


 23 pg (25-35)


L


 


Mean Corpuscular Hemoglobin


Concent 32 g/dL


(31-37)


 


Red Cell Distribution Width


 22.5 %


(11.5-14.5)  H


 


Platelet Count


 187 x10^3/uL


(140-400)


 


Neutrophils (%) (Auto) 63 % (31-73)  


 


Lymphocytes (%) (Auto) 28 % (24-48)  


 


Monocytes (%) (Auto) 6 % (0-9)  


 


Eosinophils (%) (Auto) 3 % (0-3)  


 


Basophils (%) (Auto) 1 % (0-3)  


 


Neutrophils # (Auto)


 3.1 x10^3/uL


(1.8-7.7)


 


Lymphocytes # (Auto)


 1.4 x10^3/uL


(1.0-4.8)


 


Monocytes # (Auto)


 0.3 x10^3/uL


(0.0-1.1)


 


Eosinophils # (Auto)


 0.1 x10^3/uL


(0.0-0.7)


 


Basophils # (Auto)


 0.0 x10^3/uL


(0.0-0.2)


 


Platelet Estimate Pending  





                                Laboratory Tests


21 21:30








Vital Signs:





                                   Vital Signs








  Date Time  Temp Pulse Resp B/P (MAP) Pulse Ox O2 Delivery O2 Flow Rate FiO2


 


21 21:15 98.6 72 17 127/64 (85) 98 Room Air  





 98.6       











EKG:


EKG:


[] EKG performed at 2106 heart rate 71 sinus rhythm no ST elevation no ST 

depression no acute MI





Radiology/Procedures:


Radiology/Procedures:


[]


Impression:


CT head no acute abnormality


Chest x-ray no acute abnormality





Course & Med Decision Making:


Course & Med Decision Making


Pertinent Labs and Imaging studies reviewed. (See chart for details)





[] Patient was evaluated for chief complaint.  Work-up consisted of laboratory 

analysis and radiologic imaging.  Results reviewed and discussed with patient.  

Treatment included IV fluids.





Troponin negative EKG no acute abnormalities


CT head no acute process.





Patient discharged home with instruction to follow-up with her primary care 

physician.





Rupert Disclaimer:


Dragon Disclaimer:


This electronic medical record was generated, in whole or in part, using a voice

 recognition dictation system.





Departure


Departure


Impression:  


   Primary Impression:  


   Chest pain


   Additional Impressions:  


   Paresthesia


   Dizziness


Disposition:  01 HOME / SELF CARE / HOMELESS


Condition:  STABLE


Referrals:  


PRANAV ROSEN (PCP)


Patient Instructions:  Chest Pain (Nonspecific), Dizziness, Paresthesia











TALI CASTAÑEDA DO            May 23, 2021 22:02

## 2021-05-23 NOTE — RAD
XR CHEST 1V



Clinical History: Reason: chest pain / Spl. Instructions:  / History: 



Technique:  AP view of the chest was obtained at 5/23/2021 10:48 PM.



Comparison: None.



Findings:

The cardiomediastinal silhouette is normal. The pulmonary vasculature is normal. The lungs and pleura
l margins are clear.



Impression:  



No evidence of an acute cardiopulmonary process.



Electronically signed by: Willi Booker III, MD (5/23/2021 11:24 PM) [facility]

## 2021-05-24 VITALS — SYSTOLIC BLOOD PRESSURE: 116 MMHG | DIASTOLIC BLOOD PRESSURE: 67 MMHG

## 2021-05-24 LAB — PREG TEST PT QUAL: NEGATIVE

## 2021-05-24 NOTE — RAD
CT Head W/O Contrast:



History: Reason: dizziness head / Spl. Instructions:  / History: 



Comparison: none



Axial images were obtained without contrast.



The gray and white matter appears normal and symmetrical for the patients age.  There is no mass effe
ct, extraaxial fluid collections or hydrocephalus.  There is no gross bleed.  There is no focal loss 
of gray-white matter distinction to suggest acute ischemia, i.e. stroke.



Impression:  No acute findings.



RS Compliance Statement:



One or more of the following individualized dose reduction techniques were utilized for this examinat
ion:  

1. Automated exposure control  

2. Adjustment of the mA and/or kV according to patient size  

3. Use of iterative reconstruction technique



Electronically signed by: Willi Booker III, MD (5/24/2021 12:51 AM) [facility]